# Patient Record
Sex: FEMALE | Race: WHITE | NOT HISPANIC OR LATINO
[De-identification: names, ages, dates, MRNs, and addresses within clinical notes are randomized per-mention and may not be internally consistent; named-entity substitution may affect disease eponyms.]

---

## 2017-01-12 ENCOUNTER — APPOINTMENT (OUTPATIENT)
Dept: DERMATOLOGY | Facility: CLINIC | Age: 47
End: 2017-01-12

## 2017-07-12 ENCOUNTER — APPOINTMENT (OUTPATIENT)
Dept: DERMATOLOGY | Facility: CLINIC | Age: 47
End: 2017-07-12

## 2017-12-13 ENCOUNTER — FORM ENCOUNTER (OUTPATIENT)
Age: 47
End: 2017-12-13

## 2018-06-03 ENCOUNTER — FORM ENCOUNTER (OUTPATIENT)
Age: 48
End: 2018-06-03

## 2018-06-04 ENCOUNTER — FORM ENCOUNTER (OUTPATIENT)
Age: 48
End: 2018-06-04

## 2018-07-05 ENCOUNTER — APPOINTMENT (OUTPATIENT)
Dept: DERMATOLOGY | Facility: CLINIC | Age: 48
End: 2018-07-05
Payer: COMMERCIAL

## 2018-07-05 PROCEDURE — 99213 OFFICE O/P EST LOW 20 MIN: CPT

## 2019-07-12 ENCOUNTER — APPOINTMENT (OUTPATIENT)
Dept: DERMATOLOGY | Facility: CLINIC | Age: 49
End: 2019-07-12

## 2019-12-29 ENCOUNTER — EMERGENCY (EMERGENCY)
Facility: HOSPITAL | Age: 49
LOS: 1 days | Discharge: HOME | End: 2019-12-29
Attending: EMERGENCY MEDICINE
Payer: COMMERCIAL

## 2019-12-29 VITALS
HEART RATE: 96 BPM | HEIGHT: 62 IN | TEMPERATURE: 99 F | RESPIRATION RATE: 20 BRPM | SYSTOLIC BLOOD PRESSURE: 188 MMHG | DIASTOLIC BLOOD PRESSURE: 81 MMHG | OXYGEN SATURATION: 99 % | WEIGHT: 214.95 LBS

## 2019-12-29 DIAGNOSIS — C44.91 BASAL CELL CARCINOMA OF SKIN, UNSPECIFIED: Chronic | ICD-10-CM

## 2019-12-29 DIAGNOSIS — Z98.89 OTHER SPECIFIED POSTPROCEDURAL STATES: Chronic | ICD-10-CM

## 2019-12-29 LAB
ALBUMIN SERPL ELPH-MCNC: 4 G/DL — SIGNIFICANT CHANGE UP (ref 3.3–5.2)
ALP SERPL-CCNC: 92 U/L — SIGNIFICANT CHANGE UP (ref 40–120)
ALT FLD-CCNC: 12 U/L — SIGNIFICANT CHANGE UP
ANION GAP SERPL CALC-SCNC: 15 MMOL/L — SIGNIFICANT CHANGE UP (ref 5–17)
APTT BLD: 34.5 SEC — SIGNIFICANT CHANGE UP (ref 27.5–36.3)
AST SERPL-CCNC: 16 U/L — SIGNIFICANT CHANGE UP
B PERT DNA SPEC QL NAA+PROBE: SIGNIFICANT CHANGE UP
BILIRUB SERPL-MCNC: 0.4 MG/DL — SIGNIFICANT CHANGE UP (ref 0.4–2)
BUN SERPL-MCNC: 10 MG/DL — SIGNIFICANT CHANGE UP (ref 8–20)
C PNEUM DNA SPEC QL NAA+PROBE: SIGNIFICANT CHANGE UP
CALCIUM SERPL-MCNC: 9.3 MG/DL — SIGNIFICANT CHANGE UP (ref 8.6–10.2)
CHLORIDE SERPL-SCNC: 97 MMOL/L — LOW (ref 98–107)
CO2 SERPL-SCNC: 25 MMOL/L — SIGNIFICANT CHANGE UP (ref 22–29)
CREAT SERPL-MCNC: 0.51 MG/DL — SIGNIFICANT CHANGE UP (ref 0.5–1.3)
FLUAV H1 2009 PAND RNA SPEC QL NAA+PROBE: SIGNIFICANT CHANGE UP
FLUAV H1 RNA SPEC QL NAA+PROBE: SIGNIFICANT CHANGE UP
FLUAV H3 RNA SPEC QL NAA+PROBE: SIGNIFICANT CHANGE UP
FLUAV SUBTYP SPEC NAA+PROBE: DETECTED — SIGNIFICANT CHANGE UP
FLUBV RNA SPEC QL NAA+PROBE: SIGNIFICANT CHANGE UP
GLUCOSE SERPL-MCNC: 100 MG/DL — SIGNIFICANT CHANGE UP (ref 70–115)
HADV DNA SPEC QL NAA+PROBE: SIGNIFICANT CHANGE UP
HCOV PNL SPEC NAA+PROBE: SIGNIFICANT CHANGE UP
HCT VFR BLD CALC: 44.8 % — SIGNIFICANT CHANGE UP (ref 34.5–45)
HGB BLD-MCNC: 14.9 G/DL — SIGNIFICANT CHANGE UP (ref 11.5–15.5)
HMPV RNA SPEC QL NAA+PROBE: SIGNIFICANT CHANGE UP
HPIV1 RNA SPEC QL NAA+PROBE: SIGNIFICANT CHANGE UP
HPIV2 RNA SPEC QL NAA+PROBE: SIGNIFICANT CHANGE UP
HPIV3 RNA SPEC QL NAA+PROBE: SIGNIFICANT CHANGE UP
HPIV4 RNA SPEC QL NAA+PROBE: SIGNIFICANT CHANGE UP
INR BLD: 1.2 RATIO — HIGH (ref 0.88–1.16)
MCHC RBC-ENTMCNC: 26.8 PG — LOW (ref 27–34)
MCHC RBC-ENTMCNC: 33.3 GM/DL — SIGNIFICANT CHANGE UP (ref 32–36)
MCV RBC AUTO: 80.7 FL — SIGNIFICANT CHANGE UP (ref 80–100)
NT-PROBNP SERPL-SCNC: 92 PG/ML — SIGNIFICANT CHANGE UP (ref 0–300)
PLATELET # BLD AUTO: 269 K/UL — SIGNIFICANT CHANGE UP (ref 150–400)
POTASSIUM SERPL-MCNC: 3.1 MMOL/L — LOW (ref 3.5–5.3)
POTASSIUM SERPL-SCNC: 3.1 MMOL/L — LOW (ref 3.5–5.3)
PROT SERPL-MCNC: 7.3 G/DL — SIGNIFICANT CHANGE UP (ref 6.6–8.7)
PROTHROM AB SERPL-ACNC: 13.9 SEC — HIGH (ref 10–12.9)
RAPID RVP RESULT: DETECTED
RBC # BLD: 5.55 M/UL — HIGH (ref 3.8–5.2)
RBC # FLD: 13 % — SIGNIFICANT CHANGE UP (ref 10.3–14.5)
RV+EV RNA SPEC QL NAA+PROBE: SIGNIFICANT CHANGE UP
SODIUM SERPL-SCNC: 137 MMOL/L — SIGNIFICANT CHANGE UP (ref 135–145)
TROPONIN T SERPL-MCNC: <0.01 NG/ML — SIGNIFICANT CHANGE UP (ref 0–0.06)
WBC # BLD: 10.28 K/UL — SIGNIFICANT CHANGE UP (ref 3.8–10.5)
WBC # FLD AUTO: 10.28 K/UL — SIGNIFICANT CHANGE UP (ref 3.8–10.5)

## 2019-12-29 PROCEDURE — 93010 ELECTROCARDIOGRAM REPORT: CPT

## 2019-12-29 PROCEDURE — 99284 EMERGENCY DEPT VISIT MOD MDM: CPT

## 2019-12-29 PROCEDURE — 71046 X-RAY EXAM CHEST 2 VIEWS: CPT | Mod: 26

## 2019-12-29 RX ORDER — ALBUTEROL 90 UG/1
2.5 AEROSOL, METERED ORAL ONCE
Refills: 0 | Status: COMPLETED | OUTPATIENT
Start: 2019-12-29 | End: 2019-12-29

## 2019-12-29 RX ORDER — SODIUM CHLORIDE 9 MG/ML
1000 INJECTION, SOLUTION INTRAVENOUS ONCE
Refills: 0 | Status: COMPLETED | OUTPATIENT
Start: 2019-12-29 | End: 2019-12-29

## 2019-12-29 RX ORDER — POTASSIUM CHLORIDE 20 MEQ
40 PACKET (EA) ORAL ONCE
Refills: 0 | Status: COMPLETED | OUTPATIENT
Start: 2019-12-29 | End: 2019-12-29

## 2019-12-29 RX ORDER — ACETAMINOPHEN 500 MG
650 TABLET ORAL ONCE
Refills: 0 | Status: COMPLETED | OUTPATIENT
Start: 2019-12-29 | End: 2019-12-29

## 2019-12-29 RX ORDER — POTASSIUM CHLORIDE 20 MEQ
10 PACKET (EA) ORAL ONCE
Refills: 0 | Status: COMPLETED | OUTPATIENT
Start: 2019-12-29 | End: 2019-12-29

## 2019-12-29 RX ORDER — SODIUM CHLORIDE 9 MG/ML
3 INJECTION INTRAMUSCULAR; INTRAVENOUS; SUBCUTANEOUS ONCE
Refills: 0 | Status: COMPLETED | OUTPATIENT
Start: 2019-12-29 | End: 2019-12-29

## 2019-12-29 RX ORDER — IBUPROFEN 200 MG
600 TABLET ORAL ONCE
Refills: 0 | Status: COMPLETED | OUTPATIENT
Start: 2019-12-29 | End: 2019-12-29

## 2019-12-29 RX ADMIN — SODIUM CHLORIDE 3 MILLILITER(S): 9 INJECTION INTRAMUSCULAR; INTRAVENOUS; SUBCUTANEOUS at 22:15

## 2019-12-29 RX ADMIN — Medication 600 MILLIGRAM(S): at 22:04

## 2019-12-29 RX ADMIN — Medication 40 MILLIEQUIVALENT(S): at 20:24

## 2019-12-29 RX ADMIN — ALBUTEROL 2.5 MILLIGRAM(S): 90 AEROSOL, METERED ORAL at 17:00

## 2019-12-29 RX ADMIN — Medication 100 MILLIEQUIVALENT(S): at 22:04

## 2019-12-29 RX ADMIN — Medication 650 MILLIGRAM(S): at 17:00

## 2019-12-29 RX ADMIN — SODIUM CHLORIDE 3000 MILLILITER(S): 9 INJECTION, SOLUTION INTRAVENOUS at 20:24

## 2019-12-29 NOTE — ED PROVIDER NOTE - NS ED ROS FT
Denies /n/v//sob/palpitations/ rash/dizziness/abd.pain/d/c/dysuria/hematuria  +chills myalgias ha sore throat cough chest tightness

## 2019-12-29 NOTE — ED PROVIDER NOTE - CLINICAL SUMMARY MEDICAL DECISION MAKING FREE TEXT BOX
influenza +--most likely all viral illness k 3.1 hemolyzed will replete check 2nd trop potassium; hydrate neb saline for cough--anticipate dc

## 2019-12-29 NOTE — ED PROVIDER NOTE - PATIENT PORTAL LINK FT
DISPLAY PLAN FREE TEXT You can access the FollowMyHealth Patient Portal offered by [facility] by registering at the following website: [email or web address]. By joining [employer]’s FollowMyHealth portal, you will also be able to view your health information using other applications (apps) compatible with our system.

## 2019-12-29 NOTE — ED PROVIDER NOTE - OBJECTIVE STATEMENT
50yo F no pmhx pw with chest tightness body aches ha chills since yesterday. notes that was moving 48yo F no pmhx pw with chest tightness body aches mild cough sore throat ha chills since yesterday. notes that was moving things around the house yesterday so though body was sore after that, went to urgent care was tested for the flu was told hat its negative +sick contacts  with uri symptoms; told urgent care that was having tightness in the chest was told to go to the ED for evaluation.  pt notes that always has high bp when sees doctors. notes cp non radiating, no numbness/tingling no diaphoresis.  Denies n/v/sob/palpitations/rash/dizziness/abd.pain/d/c/dysuria/hematuria. no recent travel no hx of dvt/pe

## 2019-12-30 VITALS
DIASTOLIC BLOOD PRESSURE: 85 MMHG | SYSTOLIC BLOOD PRESSURE: 173 MMHG | RESPIRATION RATE: 18 BRPM | OXYGEN SATURATION: 96 % | TEMPERATURE: 99 F | HEART RATE: 86 BPM

## 2019-12-30 LAB
ANION GAP SERPL CALC-SCNC: 12 MMOL/L — SIGNIFICANT CHANGE UP (ref 5–17)
BUN SERPL-MCNC: 9 MG/DL — SIGNIFICANT CHANGE UP (ref 8–20)
CALCIUM SERPL-MCNC: 8.6 MG/DL — SIGNIFICANT CHANGE UP (ref 8.6–10.2)
CHLORIDE SERPL-SCNC: 101 MMOL/L — SIGNIFICANT CHANGE UP (ref 98–107)
CO2 SERPL-SCNC: 24 MMOL/L — SIGNIFICANT CHANGE UP (ref 22–29)
CREAT SERPL-MCNC: 0.54 MG/DL — SIGNIFICANT CHANGE UP (ref 0.5–1.3)
GLUCOSE SERPL-MCNC: 118 MG/DL — HIGH (ref 70–115)
POTASSIUM SERPL-MCNC: 3.1 MMOL/L — LOW (ref 3.5–5.3)
POTASSIUM SERPL-SCNC: 3.1 MMOL/L — LOW (ref 3.5–5.3)
SODIUM SERPL-SCNC: 137 MMOL/L — SIGNIFICANT CHANGE UP (ref 135–145)

## 2019-12-30 PROCEDURE — 85027 COMPLETE CBC AUTOMATED: CPT

## 2019-12-30 PROCEDURE — 83880 ASSAY OF NATRIURETIC PEPTIDE: CPT

## 2019-12-30 PROCEDURE — 80048 BASIC METABOLIC PNL TOTAL CA: CPT

## 2019-12-30 PROCEDURE — 99284 EMERGENCY DEPT VISIT MOD MDM: CPT | Mod: 25

## 2019-12-30 PROCEDURE — 71046 X-RAY EXAM CHEST 2 VIEWS: CPT

## 2019-12-30 PROCEDURE — 85610 PROTHROMBIN TIME: CPT

## 2019-12-30 PROCEDURE — 93005 ELECTROCARDIOGRAM TRACING: CPT

## 2019-12-30 PROCEDURE — 87486 CHLMYD PNEUM DNA AMP PROBE: CPT

## 2019-12-30 PROCEDURE — 80053 COMPREHEN METABOLIC PANEL: CPT

## 2019-12-30 PROCEDURE — 94640 AIRWAY INHALATION TREATMENT: CPT

## 2019-12-30 PROCEDURE — 85730 THROMBOPLASTIN TIME PARTIAL: CPT

## 2019-12-30 PROCEDURE — 87798 DETECT AGENT NOS DNA AMP: CPT

## 2019-12-30 PROCEDURE — 87581 M.PNEUMON DNA AMP PROBE: CPT

## 2019-12-30 PROCEDURE — 87633 RESP VIRUS 12-25 TARGETS: CPT

## 2019-12-30 PROCEDURE — 36415 COLL VENOUS BLD VENIPUNCTURE: CPT

## 2019-12-30 PROCEDURE — 84484 ASSAY OF TROPONIN QUANT: CPT

## 2019-12-30 PROCEDURE — 96374 THER/PROPH/DIAG INJ IV PUSH: CPT

## 2019-12-30 PROCEDURE — 93010 ELECTROCARDIOGRAM REPORT: CPT

## 2019-12-30 RX ORDER — POTASSIUM CHLORIDE 20 MEQ
40 PACKET (EA) ORAL ONCE
Refills: 0 | Status: COMPLETED | OUTPATIENT
Start: 2019-12-30 | End: 2019-12-30

## 2019-12-30 RX ADMIN — Medication 40 MILLIEQUIVALENT(S): at 02:03

## 2020-01-20 ENCOUNTER — RESULT REVIEW (OUTPATIENT)
Age: 50
End: 2020-01-20

## 2020-01-20 ENCOUNTER — APPOINTMENT (OUTPATIENT)
Dept: DERMATOLOGY | Facility: CLINIC | Age: 50
End: 2020-01-20
Payer: COMMERCIAL

## 2020-01-20 PROCEDURE — 11102 TANGNTL BX SKIN SINGLE LES: CPT

## 2020-01-20 PROCEDURE — 99214 OFFICE O/P EST MOD 30 MIN: CPT | Mod: 25

## 2020-03-26 ENCOUNTER — APPOINTMENT (OUTPATIENT)
Dept: DERMATOLOGY | Facility: CLINIC | Age: 50
End: 2020-03-26
Payer: COMMERCIAL

## 2020-03-26 PROCEDURE — 13132 CMPLX RPR F/C/C/M/N/AX/G/H/F: CPT

## 2020-03-26 PROCEDURE — 17311 MOHS 1 STAGE H/N/HF/G: CPT

## 2020-05-07 ENCOUNTER — APPOINTMENT (OUTPATIENT)
Dept: DERMATOLOGY | Facility: CLINIC | Age: 50
End: 2020-05-07
Payer: COMMERCIAL

## 2020-05-07 DIAGNOSIS — C44.319 BASAL CELL CARCINOMA OF SKIN OF OTHER PARTS OF FACE: ICD-10-CM

## 2020-05-07 PROCEDURE — 99213 OFFICE O/P EST LOW 20 MIN: CPT

## 2020-12-09 ENCOUNTER — APPOINTMENT (OUTPATIENT)
Dept: UROLOGY | Facility: CLINIC | Age: 50
End: 2020-12-09
Payer: COMMERCIAL

## 2020-12-09 VITALS
DIASTOLIC BLOOD PRESSURE: 104 MMHG | BODY MASS INDEX: 39.56 KG/M2 | HEIGHT: 62 IN | SYSTOLIC BLOOD PRESSURE: 189 MMHG | OXYGEN SATURATION: 99 % | HEART RATE: 73 BPM | WEIGHT: 215 LBS

## 2020-12-09 PROCEDURE — 99204 OFFICE O/P NEW MOD 45 MIN: CPT

## 2020-12-09 PROCEDURE — 99072 ADDL SUPL MATRL&STAF TM PHE: CPT

## 2020-12-10 LAB
APPEARANCE: CLEAR
BACTERIA: NEGATIVE
BILIRUBIN URINE: NEGATIVE
BLOOD URINE: NEGATIVE
COLOR: NORMAL
GLUCOSE QUALITATIVE U: NEGATIVE
HYALINE CASTS: 1 /LPF
KETONES URINE: NEGATIVE
LEUKOCYTE ESTERASE URINE: NEGATIVE
MICROSCOPIC-UA: NORMAL
NITRITE URINE: NEGATIVE
PH URINE: 6.5
PROTEIN URINE: NEGATIVE
RED BLOOD CELLS URINE: 3 /HPF
SPECIFIC GRAVITY URINE: 1.02
SQUAMOUS EPITHELIAL CELLS: 2 /HPF
UROBILINOGEN URINE: NORMAL
WHITE BLOOD CELLS URINE: 2 /HPF

## 2020-12-11 LAB — BACTERIA UR CULT: NORMAL

## 2020-12-11 NOTE — HISTORY OF PRESENT ILLNESS
[FreeTextEntry1] : This patient has history of left kidney obstruction and had a left pyeloplasty in 2014. Her Renal scan at the time showed 16% kidney function and was considering a nephrectomy at that time but never got it. SHe now presents with left flank pain that is constant 2/10 which is dull. She denies any other urinary symptoms.\par  \par

## 2020-12-21 DIAGNOSIS — N13.5 CROSSING VESSEL AND STRICTURE OF URETER W/OUT HYDRONEPHROSIS: ICD-10-CM

## 2020-12-26 ENCOUNTER — APPOINTMENT (OUTPATIENT)
Dept: DERMATOLOGY | Facility: CLINIC | Age: 50
End: 2020-12-26

## 2021-01-25 LAB
ANION GAP SERPL CALC-SCNC: 14 MMOL/L
BUN SERPL-MCNC: 12 MG/DL
CALCIUM SERPL-MCNC: 9.5 MG/DL
CHLORIDE SERPL-SCNC: 102 MMOL/L
CO2 SERPL-SCNC: 25 MMOL/L
CREAT SERPL-MCNC: 0.7 MG/DL
GLUCOSE SERPL-MCNC: 88 MG/DL
POTASSIUM SERPL-SCNC: 3.9 MMOL/L
SODIUM SERPL-SCNC: 142 MMOL/L

## 2021-03-02 ENCOUNTER — OUTPATIENT (OUTPATIENT)
Dept: OUTPATIENT SERVICES | Facility: HOSPITAL | Age: 51
LOS: 1 days | End: 2021-03-02
Payer: COMMERCIAL

## 2021-03-02 DIAGNOSIS — C44.91 BASAL CELL CARCINOMA OF SKIN, UNSPECIFIED: Chronic | ICD-10-CM

## 2021-03-02 DIAGNOSIS — Q62.39 OTHER OBSTRUCTIVE DEFECTS OF RENAL PELVIS AND URETER: ICD-10-CM

## 2021-03-02 DIAGNOSIS — Z98.89 OTHER SPECIFIED POSTPROCEDURAL STATES: Chronic | ICD-10-CM

## 2021-03-02 PROCEDURE — 78708 K FLOW/FUNCT IMAGE W/DRUG: CPT | Mod: 26

## 2021-03-02 PROCEDURE — 78708 K FLOW/FUNCT IMAGE W/DRUG: CPT

## 2021-03-02 PROCEDURE — A9562: CPT

## 2021-03-02 RX ORDER — FUROSEMIDE 40 MG
40 TABLET ORAL ONCE
Refills: 0 | Status: DISCONTINUED | OUTPATIENT
Start: 2021-03-02 | End: 2021-03-16

## 2021-03-02 RX ORDER — SODIUM CHLORIDE 9 MG/ML
977 INJECTION INTRAMUSCULAR; INTRAVENOUS; SUBCUTANEOUS ONCE
Refills: 0 | Status: DISCONTINUED | OUTPATIENT
Start: 2021-03-02 | End: 2021-03-16

## 2021-03-07 ENCOUNTER — FORM ENCOUNTER (OUTPATIENT)
Age: 51
End: 2021-03-07

## 2021-03-08 ENCOUNTER — APPOINTMENT (OUTPATIENT)
Dept: DERMATOLOGY | Facility: CLINIC | Age: 51
End: 2021-03-08
Payer: COMMERCIAL

## 2021-03-08 ENCOUNTER — APPOINTMENT (OUTPATIENT)
Dept: CARDIOLOGY | Facility: CLINIC | Age: 51
End: 2021-03-08
Payer: COMMERCIAL

## 2021-03-08 ENCOUNTER — NON-APPOINTMENT (OUTPATIENT)
Age: 51
End: 2021-03-08

## 2021-03-08 VITALS
WEIGHT: 212 LBS | HEIGHT: 62 IN | OXYGEN SATURATION: 98 % | HEART RATE: 69 BPM | RESPIRATION RATE: 16 BRPM | TEMPERATURE: 98.7 F | DIASTOLIC BLOOD PRESSURE: 100 MMHG | SYSTOLIC BLOOD PRESSURE: 162 MMHG | BODY MASS INDEX: 39.01 KG/M2

## 2021-03-08 DIAGNOSIS — R07.89 OTHER CHEST PAIN: ICD-10-CM

## 2021-03-08 DIAGNOSIS — R94.39 ABNORMAL RESULT OF OTHER CARDIOVASCULAR FUNCTION STUDY: ICD-10-CM

## 2021-03-08 DIAGNOSIS — K21.9 GASTRO-ESOPHAGEAL REFLUX DISEASE W/OUT ESOPHAGITIS: ICD-10-CM

## 2021-03-08 PROCEDURE — 99072 ADDL SUPL MATRL&STAF TM PHE: CPT

## 2021-03-08 PROCEDURE — 99214 OFFICE O/P EST MOD 30 MIN: CPT

## 2021-03-08 PROCEDURE — 93000 ELECTROCARDIOGRAM COMPLETE: CPT

## 2021-03-08 RX ORDER — OMEPRAZOLE MAGNESIUM 20 MG/1
20 TABLET, DELAYED RELEASE ORAL DAILY
Refills: 0 | Status: ACTIVE | COMMUNITY

## 2021-03-08 RX ORDER — FLUTICASONE PROPIONATE 50 UG/1
50 SPRAY, METERED NASAL
Qty: 16 | Refills: 0 | Status: ACTIVE | COMMUNITY
Start: 2020-11-21

## 2021-03-08 NOTE — REASON FOR VISIT
[FreeTextEntry1] : 50 year old female presenting for cardiac evaluation last seen in 2016. Elevated blood pressure readings prompted this visit.\par \par While at her OBGYN  her BP was 132/90, during her annual physical 128/72\par \par Although she works for a [employer] company she does stand majority of the day.  Does not have any formal exercise regimen but when climbing stairs she does experience some SOB, attributes this to her mask\par \par Her last stress test from 2015 revealed 1.0mm ST depression during peak stress in multiple leads but attributes that to a very stressful period in her life. There has been no chest pain/tightness. At a certain point she was on Atenolol, unsure when this was stopped\par \par She is s/p renal eval  at [facility] with a hx of L kidney obstruction and pyeloplasty 2014 . At that time her left kid. fxn was 16 % states  now by her report 27%\par \par Diet seems to be suboptimal  although she does try to follow  serving size standards.

## 2021-03-08 NOTE — PHYSICAL EXAM
[General Appearance - Well Developed] : well developed [Normal Conjunctiva] : the conjunctiva exhibited no abnormalities [Eyelids - No Xanthelasma] : the eyelids demonstrated no xanthelasmas [Normal Oral Mucosa] : normal oral mucosa [No Oral Pallor] : no oral pallor [No Oral Cyanosis] : no oral cyanosis [Normal Oropharynx] : normal oropharynx [Normal Jugular Venous A Waves Present] : normal jugular venous A waves present [Normal Jugular Venous V Waves Present] : normal jugular venous V waves present [No Jugular Venous Cardoso A Waves] : no jugular venous cardoso A waves [Respiration, Rhythm And Depth] : normal respiratory rhythm and effort [Exaggerated Use Of Accessory Muscles For Inspiration] : no accessory muscle use [Auscultation Breath Sounds / Voice Sounds] : lungs were clear to auscultation bilaterally [Chest Palpation] : palpation of the chest revealed no abnormalities [Lungs Percussion] : the lungs were normal to percussion [Heart Rate And Rhythm] : heart rate and rhythm were normal [Heart Sounds] : normal S1 and S2 [Murmurs] : no murmurs present [Arterial Pulses Normal] : the arterial pulses were normal [Edema] : no peripheral edema present [Veins - Varicosity Changes] : no varicosital changes were noted in the lower extremities [Bowel Sounds] : normal bowel sounds [Abdomen Soft] : soft [Abdomen Tenderness] : non-tender [Abdomen Mass (___ Cm)] : no abdominal mass palpated [Abdomen Hernia] : no hernia was discovered [Abnormal Walk] : normal gait [Gait - Sufficient For Exercise Testing] : the gait was sufficient for exercise testing [Nail Clubbing] : no clubbing of the fingernails [Cyanosis, Localized] : no localized cyanosis [Petechial Hemorrhages (___cm)] : no petechial hemorrhages [Nail Splinter Hemorrhages] : no splinter hemorrhages of the nails [] : no ischemic changes [Fingers Osler's Nodes] : Osler's nodes were not seenon the fingers [Skin Color & Pigmentation] : normal skin color and pigmentation [FreeTextEntry1] : obese

## 2021-03-08 NOTE — HISTORY OF PRESENT ILLNESS
[FreeTextEntry1] : There seems to be a hx of elevated blood pressures possibly exacerbated by personal stressors.

## 2021-03-08 NOTE — ASSESSMENT
[FreeTextEntry1] : ECG-  SR at 69 bpm, possible AWMI pattern, ST and T wave abnormality, \par \par Lab data 1/20/20\par Chol. 161\par HDL    41\par LDL    95\par Tri.   126\par A1C 5.1\par Creat. 0.60\par \par \par Impression\par 50  year old female presenting after a 5 year lapse in care with concerns of turning 51 and periodic elevated blood pressure readings.\par \par -Minor nonspecific T wave abnormalities on ECG today which are not new.  Not having any cardiac related symptoms.\par \par -Mild HEWITT which she attributes to sedentary life style and maybe some weight gain.\par \par -No family hx of premature CAD\par \par -May be a component of white coat syndrome  with regard to her BP today. Does not monitor her BPs at home.\par \par -Lipids acceptable.\par \par -Last stress test likely false positive with no evidence on SPECT.\par \par -BMI 38 and aware of obesity status and its impact on her health. Left renal status being formally evaluated by urology\par \par \par Plan\par 1. Instructed to check her blood pressure daily and call back in 2 weeks with an average.\par \par 2. Repeat BW through her PCP.\par \par 3. Clinical asymptomatic from  coronary perspective. \par \par 4. No medications to be prescribed at this time. \par \par 5. Stress the need to watch diet carefully, avoid carbs , starches , sugars. Needs to incorporate more  leafy greens and lean proteins\par \par 6. Encouraged that she start some formal exercise regimen.\par \par Follow up can be made for one year unless her  blood pressures are elevated.

## 2021-03-31 ENCOUNTER — APPOINTMENT (OUTPATIENT)
Age: 51
End: 2021-03-31
Payer: COMMERCIAL

## 2021-03-31 VITALS
SYSTOLIC BLOOD PRESSURE: 185 MMHG | OXYGEN SATURATION: 98 % | WEIGHT: 216 LBS | TEMPERATURE: 97 F | BODY MASS INDEX: 39.75 KG/M2 | HEART RATE: 78 BPM | DIASTOLIC BLOOD PRESSURE: 108 MMHG | HEIGHT: 62 IN

## 2021-03-31 PROCEDURE — 99072 ADDL SUPL MATRL&STAF TM PHE: CPT

## 2021-03-31 PROCEDURE — 99214 OFFICE O/P EST MOD 30 MIN: CPT

## 2021-03-31 NOTE — ASSESSMENT
[FreeTextEntry1] : 50yoF with hx L pyeloplasty in 2014 w/recurrent L flank pain\par We had a long discussion regarding treatment plan.   She will need referral to Dr Hoenig oe [place] for treatment of her renal stones.   At the same time the UPJ cab be evaluated.    She has improved renal function but her left kidney is smaller and scarred.   She has minimal pain at the moment.\par \par 30 min discussion with review of the images and complex treatment planning and writing her note\par \par

## 2021-03-31 NOTE — HISTORY OF PRESENT ILLNESS
[Flank Pain] : flank pain [FreeTextEntry1] : 50yoFwith hx L pyeloplasty in 2014 by Dr. Hardy. Previously with 16% function.  \par Patient did well for 7 years and now has recurrence of her flank pain.\par Patient reports flank pain monthly, was previously associated with her period, she is s/p hysterectomy for fibroids and unilateral oophorectomy\par Renal scan 3/2021 demonstrates 28% function on the L.\par CT also demonstrates 1.3cm L upper pole stone and 6mm L lower pole stone\par

## 2021-03-31 NOTE — PHYSICAL EXAM
[General Appearance - Well Developed] : well developed [General Appearance - Well Nourished] : well nourished [Normal Appearance] : normal appearance [Well Groomed] : well groomed [General Appearance - In No Acute Distress] : no acute distress [Respiration, Rhythm And Depth] : normal respiratory rhythm and effort [Normal Station and Gait] : the gait and station were normal for the patient's age [Skin Color & Pigmentation] : normal skin color and pigmentation [] : no rash [No Focal Deficits] : no focal deficits [Oriented To Time, Place, And Person] : oriented to person, place, and time [Not Anxious] : not anxious [Edema] : no peripheral edema [Abdomen Soft] : soft [Abdomen Tenderness] : non-tender [Costovertebral Angle Tenderness] : no ~M costovertebral angle tenderness [FreeTextEntry1] : obese abd [Urinary Bladder Findings] : the bladder was normal on palpation

## 2021-04-19 ENCOUNTER — APPOINTMENT (OUTPATIENT)
Dept: DERMATOLOGY | Facility: CLINIC | Age: 51
End: 2021-04-19

## 2021-05-06 ENCOUNTER — APPOINTMENT (OUTPATIENT)
Dept: UROLOGY | Facility: CLINIC | Age: 51
End: 2021-05-06

## 2021-06-09 ENCOUNTER — APPOINTMENT (OUTPATIENT)
Dept: UROLOGY | Facility: CLINIC | Age: 51
End: 2021-06-09
Payer: COMMERCIAL

## 2021-06-09 VITALS — HEIGHT: 62 IN | BODY MASS INDEX: 38.64 KG/M2 | WEIGHT: 210 LBS

## 2021-06-09 PROCEDURE — 99072 ADDL SUPL MATRL&STAF TM PHE: CPT

## 2021-06-09 PROCEDURE — 99214 OFFICE O/P EST MOD 30 MIN: CPT

## 2021-06-09 NOTE — ASSESSMENT
[FreeTextEntry1] : CT scan reviewed: 1.5 cm left upper pole stone, 9-10 mm left lower pole stone. Density 1243 HU.\par \par I had long discussion with patient about their stones, and about options, risks, and benefits of all treatments.  Main options for definitive stone treatment include ESWL, URS, PCNL.  \par \par ESWL success best with smaller, less dense stones, and with short skin to stone distance and favorable location of the stone within the urinary tract, while URS is more successful treatment with multiple stones, more dense stones, or challenging body habitus or stone location.  PCNL is best option for larger, more dense and complex stones, and particularly those involving the lower pole.  Non-definitive stone treatment options for drainage, using either stents or nephrostomy, also reviewed: these are of lower immediate surgical risks, but incur multiple procedures to manage and may have their own complications and effects on quality of life.  Still, nephrostomy or nephroureteral catheter can allow maintenance of urinary system drainage without surgical risks, and management in office with exchanges (avoiding the anesthesia and testing which would be present with bilateral internal stent exchange).\par \par Risks of nontreatment with obstruction can lead to very high rate of renal function loss in stone-bearing kidney over the next months to years.\par \par In this patient's case, I recommend... left PCNL as best option, though tract might be supra-12th rib and require flexible nephroscopy or second access. Staged left URS with laser next-best option\par \par Risks/benefits/success/recovery expectations all reviewed at length, particularly with respect to patient's comorbidities, and inclusive of infection/sepsis, bleeding, need for secondary procedures or secondary stages such as embolization or open surgery, and even risks of death due to acute issues superimposed on comorbidities.\par \par Pt prefers to consider options, d/w family, and states she will call and arrange

## 2021-06-09 NOTE — HISTORY OF PRESENT ILLNESS
[FreeTextEntry1] : 50 yr old female presents to establish care for kidney stones. Initially seen by Dr. Hardy in 2014 for left flank pain , s/p pyeloplasty 5/2014. December 2020, pt developed left flank pain- saw Dr. Kingsley. CT in 01/21- left kidney 1.3 cm stone, and 0.6 cm stone. Left mild- mod hydro. Left kidney is smaller than right. Patient than saw Dr. Bennett. Renal scan 3/2021 demonstrates 28% function on the L\par \par No current flank or abdominal pain, no hematuria or fever. [Dysuria] : no dysuria [Hematuria - Gross] : no gross hematuria

## 2021-06-09 NOTE — PHYSICAL EXAM
[General Appearance - Well Developed] : well developed [Edema] : no peripheral edema [] : no respiratory distress [Abdomen Soft] : soft [Normal Station and Gait] : the gait and station were normal for the patient's age [Skin Color & Pigmentation] : normal skin color and pigmentation [No Focal Deficits] : no focal deficits [Oriented To Time, Place, And Person] : oriented to person, place, and time [Affect] : the affect was normal [Mood] : the mood was normal [Not Anxious] : not anxious

## 2021-08-02 ENCOUNTER — OUTPATIENT (OUTPATIENT)
Dept: OUTPATIENT SERVICES | Facility: HOSPITAL | Age: 51
LOS: 1 days | End: 2021-08-02
Payer: COMMERCIAL

## 2021-08-02 VITALS
HEIGHT: 62 IN | DIASTOLIC BLOOD PRESSURE: 70 MMHG | OXYGEN SATURATION: 98 % | SYSTOLIC BLOOD PRESSURE: 102 MMHG | WEIGHT: 212.97 LBS | RESPIRATION RATE: 15 BRPM | TEMPERATURE: 98 F | HEART RATE: 80 BPM

## 2021-08-02 DIAGNOSIS — C44.91 BASAL CELL CARCINOMA OF SKIN, UNSPECIFIED: Chronic | ICD-10-CM

## 2021-08-02 DIAGNOSIS — N20.0 CALCULUS OF KIDNEY: ICD-10-CM

## 2021-08-02 DIAGNOSIS — Z98.89 OTHER SPECIFIED POSTPROCEDURAL STATES: Chronic | ICD-10-CM

## 2021-08-02 DIAGNOSIS — Z01.818 ENCOUNTER FOR OTHER PREPROCEDURAL EXAMINATION: ICD-10-CM

## 2021-08-02 LAB
ANION GAP SERPL CALC-SCNC: 12 MMOL/L — SIGNIFICANT CHANGE UP (ref 5–17)
BLD GP AB SCN SERPL QL: NEGATIVE — SIGNIFICANT CHANGE UP
BUN SERPL-MCNC: 14 MG/DL — SIGNIFICANT CHANGE UP (ref 7–23)
CALCIUM SERPL-MCNC: 9.3 MG/DL — SIGNIFICANT CHANGE UP (ref 8.4–10.5)
CHLORIDE SERPL-SCNC: 102 MMOL/L — SIGNIFICANT CHANGE UP (ref 96–108)
CO2 SERPL-SCNC: 26 MMOL/L — SIGNIFICANT CHANGE UP (ref 22–31)
CREAT SERPL-MCNC: 0.5 MG/DL — SIGNIFICANT CHANGE UP (ref 0.5–1.3)
GLUCOSE SERPL-MCNC: 86 MG/DL — SIGNIFICANT CHANGE UP (ref 70–99)
HCT VFR BLD CALC: 44.6 % — SIGNIFICANT CHANGE UP (ref 34.5–45)
HGB BLD-MCNC: 14.7 G/DL — SIGNIFICANT CHANGE UP (ref 11.5–15.5)
MCHC RBC-ENTMCNC: 26.4 PG — LOW (ref 27–34)
MCHC RBC-ENTMCNC: 33 GM/DL — SIGNIFICANT CHANGE UP (ref 32–36)
MCV RBC AUTO: 80.2 FL — SIGNIFICANT CHANGE UP (ref 80–100)
NRBC # BLD: 0 /100 WBCS — SIGNIFICANT CHANGE UP (ref 0–0)
PLATELET # BLD AUTO: 285 K/UL — SIGNIFICANT CHANGE UP (ref 150–400)
POTASSIUM SERPL-MCNC: 3.3 MMOL/L — LOW (ref 3.5–5.3)
POTASSIUM SERPL-SCNC: 3.3 MMOL/L — LOW (ref 3.5–5.3)
RBC # BLD: 5.56 M/UL — HIGH (ref 3.8–5.2)
RBC # FLD: 13.3 % — SIGNIFICANT CHANGE UP (ref 10.3–14.5)
RH IG SCN BLD-IMP: POSITIVE — SIGNIFICANT CHANGE UP
SODIUM SERPL-SCNC: 140 MMOL/L — SIGNIFICANT CHANGE UP (ref 135–145)
WBC # BLD: 11.79 K/UL — HIGH (ref 3.8–10.5)
WBC # FLD AUTO: 11.79 K/UL — HIGH (ref 3.8–10.5)

## 2021-08-02 PROCEDURE — 87086 URINE CULTURE/COLONY COUNT: CPT

## 2021-08-02 PROCEDURE — 85027 COMPLETE CBC AUTOMATED: CPT

## 2021-08-02 PROCEDURE — 86850 RBC ANTIBODY SCREEN: CPT

## 2021-08-02 PROCEDURE — 80048 BASIC METABOLIC PNL TOTAL CA: CPT

## 2021-08-02 PROCEDURE — G0463: CPT

## 2021-08-02 PROCEDURE — 86900 BLOOD TYPING SEROLOGIC ABO: CPT

## 2021-08-02 PROCEDURE — 86901 BLOOD TYPING SEROLOGIC RH(D): CPT

## 2021-08-02 RX ORDER — CIPROFLOXACIN LACTATE 400MG/40ML
400 VIAL (ML) INTRAVENOUS ONCE
Refills: 0 | Status: DISCONTINUED | OUTPATIENT
Start: 2021-08-16 | End: 2021-08-30

## 2021-08-02 NOTE — H&P PST ADULT - NSICDXPROBLEM_GEN_ALL_CORE_FT
PROBLEM DIAGNOSES  Problem: Calculus of kidney  Assessment and Plan: Left percutaneous nephrostolithotomy on 8/16/2021  Preop instructions given.  Labs-cbc, bmp, urine culture, T&S all pending  Medical evaluation pending.  Chlorhexidine to affected area preop  Urine for pregnancy preop  ABO to be drawn preop, upon admission  COVID vaccine 8/13/2021

## 2021-08-02 NOTE — H&P PST ADULT - ACTIVITY
Works FT, walks on weekend, stairs in home, walked from parking garage to PST, housework, yardwork, swim in pool during summer

## 2021-08-02 NOTE — H&P PST ADULT - NSANTHOSAYNRD_GEN_A_CORE
No. PAPITO screening performed.  STOP BANG Legend: 0-2 = LOW Risk; 3-4 = INTERMEDIATE Risk; 5-8 = HIGH Risk

## 2021-08-02 NOTE — H&P PST ADULT - NSICDXPASTMEDICALHX_GEN_ALL_CORE_FT
PAST MEDICAL HISTORY:  Basal cell cancer nose    Congenital obstruction of ureteropelvic junction     Obesity, morbid

## 2021-08-02 NOTE — H&P PST ADULT - HISTORY OF PRESENT ILLNESS
Mrs. Taylor is a 50 year old woman with PMH obesity and kidney stone s/p pyeloplasty in 2014 who presents with 2 kidney stone in her left kidney 1.3 cm and 0.6 cm with mild to moderate hydronephrosis scheduled for left percutaneous nephrostolithotomy on 8/16/2021.    Denies s/s of COVID, denies recent international travel    COVID PCR scheduled for  8/13/2021

## 2021-08-03 LAB
CULTURE RESULTS: SIGNIFICANT CHANGE UP
SPECIMEN SOURCE: SIGNIFICANT CHANGE UP

## 2021-08-12 DIAGNOSIS — Z01.818 ENCOUNTER FOR OTHER PREPROCEDURAL EXAMINATION: ICD-10-CM

## 2021-08-13 ENCOUNTER — APPOINTMENT (OUTPATIENT)
Dept: DISASTER EMERGENCY | Facility: CLINIC | Age: 51
End: 2021-08-13

## 2021-08-14 LAB — SARS-COV-2 N GENE NPH QL NAA+PROBE: NOT DETECTED

## 2021-08-15 ENCOUNTER — TRANSCRIPTION ENCOUNTER (OUTPATIENT)
Age: 51
End: 2021-08-15

## 2021-08-16 ENCOUNTER — OUTPATIENT (OUTPATIENT)
Dept: OUTPATIENT SERVICES | Facility: HOSPITAL | Age: 51
LOS: 1 days | End: 2021-08-16
Payer: COMMERCIAL

## 2021-08-16 ENCOUNTER — APPOINTMENT (OUTPATIENT)
Dept: UROLOGY | Facility: HOSPITAL | Age: 51
End: 2021-08-16

## 2021-08-16 ENCOUNTER — RESULT REVIEW (OUTPATIENT)
Age: 51
End: 2021-08-16

## 2021-08-16 VITALS
OXYGEN SATURATION: 99 % | SYSTOLIC BLOOD PRESSURE: 161 MMHG | DIASTOLIC BLOOD PRESSURE: 86 MMHG | TEMPERATURE: 97 F | RESPIRATION RATE: 18 BRPM | HEART RATE: 78 BPM

## 2021-08-16 VITALS
RESPIRATION RATE: 18 BRPM | SYSTOLIC BLOOD PRESSURE: 162 MMHG | HEART RATE: 73 BPM | WEIGHT: 212.97 LBS | TEMPERATURE: 98 F | OXYGEN SATURATION: 98 % | HEIGHT: 62 IN | DIASTOLIC BLOOD PRESSURE: 94 MMHG

## 2021-08-16 DIAGNOSIS — C44.91 BASAL CELL CARCINOMA OF SKIN, UNSPECIFIED: Chronic | ICD-10-CM

## 2021-08-16 DIAGNOSIS — N20.0 CALCULUS OF KIDNEY: ICD-10-CM

## 2021-08-16 DIAGNOSIS — Z98.89 OTHER SPECIFIED POSTPROCEDURAL STATES: Chronic | ICD-10-CM

## 2021-08-16 LAB
ANION GAP SERPL CALC-SCNC: 14 MMOL/L — SIGNIFICANT CHANGE UP (ref 5–17)
BASOPHILS # BLD AUTO: 0.05 K/UL — SIGNIFICANT CHANGE UP (ref 0–0.2)
BASOPHILS NFR BLD AUTO: 0.6 % — SIGNIFICANT CHANGE UP (ref 0–2)
BUN SERPL-MCNC: 13 MG/DL — SIGNIFICANT CHANGE UP (ref 7–23)
CALCIUM SERPL-MCNC: 8.8 MG/DL — SIGNIFICANT CHANGE UP (ref 8.4–10.5)
CHLORIDE SERPL-SCNC: 102 MMOL/L — SIGNIFICANT CHANGE UP (ref 96–108)
CO2 SERPL-SCNC: 24 MMOL/L — SIGNIFICANT CHANGE UP (ref 22–31)
CREAT SERPL-MCNC: 0.64 MG/DL — SIGNIFICANT CHANGE UP (ref 0.5–1.3)
EOSINOPHIL # BLD AUTO: 0.15 K/UL — SIGNIFICANT CHANGE UP (ref 0–0.5)
EOSINOPHIL NFR BLD AUTO: 1.7 % — SIGNIFICANT CHANGE UP (ref 0–6)
GLUCOSE SERPL-MCNC: 118 MG/DL — HIGH (ref 70–99)
HCG UR QL: NEGATIVE — SIGNIFICANT CHANGE UP
HCT VFR BLD CALC: 45.7 % — HIGH (ref 34.5–45)
HGB BLD-MCNC: 14.8 G/DL — SIGNIFICANT CHANGE UP (ref 11.5–15.5)
IMM GRANULOCYTES NFR BLD AUTO: 0.6 % — SIGNIFICANT CHANGE UP (ref 0–1.5)
LYMPHOCYTES # BLD AUTO: 1.83 K/UL — SIGNIFICANT CHANGE UP (ref 1–3.3)
LYMPHOCYTES # BLD AUTO: 20.5 % — SIGNIFICANT CHANGE UP (ref 13–44)
MCHC RBC-ENTMCNC: 26.4 PG — LOW (ref 27–34)
MCHC RBC-ENTMCNC: 32.4 GM/DL — SIGNIFICANT CHANGE UP (ref 32–36)
MCV RBC AUTO: 81.6 FL — SIGNIFICANT CHANGE UP (ref 80–100)
MONOCYTES # BLD AUTO: 0.26 K/UL — SIGNIFICANT CHANGE UP (ref 0–0.9)
MONOCYTES NFR BLD AUTO: 2.9 % — SIGNIFICANT CHANGE UP (ref 2–14)
NEUTROPHILS # BLD AUTO: 6.58 K/UL — SIGNIFICANT CHANGE UP (ref 1.8–7.4)
NEUTROPHILS NFR BLD AUTO: 73.7 % — SIGNIFICANT CHANGE UP (ref 43–77)
NRBC # BLD: 0 /100 WBCS — SIGNIFICANT CHANGE UP (ref 0–0)
PLATELET # BLD AUTO: 267 K/UL — SIGNIFICANT CHANGE UP (ref 150–400)
POTASSIUM SERPL-MCNC: 3.1 MMOL/L — LOW (ref 3.5–5.3)
POTASSIUM SERPL-SCNC: 3.1 MMOL/L — LOW (ref 3.5–5.3)
RBC # BLD: 5.6 M/UL — HIGH (ref 3.8–5.2)
RBC # FLD: 13.3 % — SIGNIFICANT CHANGE UP (ref 10.3–14.5)
RH IG SCN BLD-IMP: POSITIVE — SIGNIFICANT CHANGE UP
SODIUM SERPL-SCNC: 140 MMOL/L — SIGNIFICANT CHANGE UP (ref 135–145)
WBC # BLD: 8.92 K/UL — SIGNIFICANT CHANGE UP (ref 3.8–10.5)
WBC # FLD AUTO: 8.92 K/UL — SIGNIFICANT CHANGE UP (ref 3.8–10.5)

## 2021-08-16 PROCEDURE — C1889: CPT

## 2021-08-16 PROCEDURE — 85025 COMPLETE CBC W/AUTO DIFF WBC: CPT

## 2021-08-16 PROCEDURE — 52332 CYSTOSCOPY AND TREATMENT: CPT | Mod: LT,59

## 2021-08-16 PROCEDURE — 71045 X-RAY EXAM CHEST 1 VIEW: CPT

## 2021-08-16 PROCEDURE — 50390 DRAINAGE OF KIDNEY LESION: CPT | Mod: LT,59

## 2021-08-16 PROCEDURE — 50081 PERQ NL/PL LITHOTRP CPLX>2CM: CPT

## 2021-08-16 PROCEDURE — C1769: CPT

## 2021-08-16 PROCEDURE — 50081 PERQ NL/PL LITHOTRP CPLX>2CM: CPT | Mod: LT

## 2021-08-16 PROCEDURE — C1758: CPT

## 2021-08-16 PROCEDURE — 71045 X-RAY EXAM CHEST 1 VIEW: CPT | Mod: 26

## 2021-08-16 PROCEDURE — 82365 CALCULUS SPECTROSCOPY: CPT

## 2021-08-16 PROCEDURE — C9399: CPT

## 2021-08-16 PROCEDURE — C2625: CPT

## 2021-08-16 PROCEDURE — 88300 SURGICAL PATH GROSS: CPT | Mod: 26,59

## 2021-08-16 PROCEDURE — 87086 URINE CULTURE/COLONY COUNT: CPT

## 2021-08-16 PROCEDURE — 87070 CULTURE OTHR SPECIMN AEROBIC: CPT

## 2021-08-16 PROCEDURE — 80048 BASIC METABOLIC PNL TOTAL CA: CPT

## 2021-08-16 PROCEDURE — 50430 NJX PX NFROSGRM &/URTRGRM: CPT | Mod: LT,59

## 2021-08-16 PROCEDURE — C1887: CPT

## 2021-08-16 PROCEDURE — 88300 SURGICAL PATH GROSS: CPT

## 2021-08-16 PROCEDURE — 81025 URINE PREGNANCY TEST: CPT

## 2021-08-16 PROCEDURE — C1726: CPT

## 2021-08-16 PROCEDURE — 76000 FLUOROSCOPY <1 HR PHYS/QHP: CPT

## 2021-08-16 RX ORDER — ASCORBIC ACID 60 MG
1 TABLET,CHEWABLE ORAL
Qty: 0 | Refills: 0 | DISCHARGE

## 2021-08-16 RX ORDER — HYDRALAZINE HCL 50 MG
5 TABLET ORAL ONCE
Refills: 0 | Status: COMPLETED | OUTPATIENT
Start: 2021-08-16 | End: 2021-08-16

## 2021-08-16 RX ORDER — TAMSULOSIN HYDROCHLORIDE 0.4 MG/1
1 CAPSULE ORAL
Qty: 30 | Refills: 0
Start: 2021-08-16 | End: 2021-09-14

## 2021-08-16 RX ORDER — FENTANYL CITRATE 50 UG/ML
25 INJECTION INTRAVENOUS
Refills: 0 | Status: DISCONTINUED | OUTPATIENT
Start: 2021-08-16 | End: 2021-08-16

## 2021-08-16 RX ORDER — LABETALOL HCL 100 MG
5 TABLET ORAL ONCE
Refills: 0 | Status: COMPLETED | OUTPATIENT
Start: 2021-08-16 | End: 2021-08-16

## 2021-08-16 RX ORDER — LABETALOL HCL 100 MG
10 TABLET ORAL ONCE
Refills: 0 | Status: COMPLETED | OUTPATIENT
Start: 2021-08-16 | End: 2021-08-16

## 2021-08-16 RX ORDER — AZILSARTAN KAMEDOXOMIL 40 MG/1
1 TABLET ORAL
Qty: 0 | Refills: 0 | DISCHARGE

## 2021-08-16 RX ORDER — FENTANYL CITRATE 50 UG/ML
50 INJECTION INTRAVENOUS
Refills: 0 | Status: DISCONTINUED | OUTPATIENT
Start: 2021-08-16 | End: 2021-08-16

## 2021-08-16 RX ORDER — SODIUM CHLORIDE 9 MG/ML
3 INJECTION INTRAMUSCULAR; INTRAVENOUS; SUBCUTANEOUS EVERY 8 HOURS
Refills: 0 | Status: DISCONTINUED | OUTPATIENT
Start: 2021-08-16 | End: 2021-08-16

## 2021-08-16 RX ORDER — SODIUM CHLORIDE 9 MG/ML
1000 INJECTION, SOLUTION INTRAVENOUS
Refills: 0 | Status: DISCONTINUED | OUTPATIENT
Start: 2021-08-16 | End: 2021-08-30

## 2021-08-16 RX ORDER — POTASSIUM CHLORIDE 20 MEQ
40 PACKET (EA) ORAL ONCE
Refills: 0 | Status: COMPLETED | OUTPATIENT
Start: 2021-08-16 | End: 2021-08-16

## 2021-08-16 RX ORDER — CHLORHEXIDINE GLUCONATE 213 G/1000ML
1 SOLUTION TOPICAL ONCE
Refills: 0 | Status: DISCONTINUED | OUTPATIENT
Start: 2021-08-16 | End: 2021-08-16

## 2021-08-16 RX ORDER — BENZOCAINE AND MENTHOL 5; 1 G/100ML; G/100ML
1 LIQUID ORAL ONCE
Refills: 0 | Status: COMPLETED | OUTPATIENT
Start: 2021-08-16 | End: 2021-08-16

## 2021-08-16 RX ORDER — OXYCODONE HYDROCHLORIDE 5 MG/1
1 TABLET ORAL
Qty: 8 | Refills: 0
Start: 2021-08-16 | End: 2021-08-17

## 2021-08-16 RX ORDER — CIPROFLOXACIN LACTATE 400MG/40ML
1 VIAL (ML) INTRAVENOUS
Qty: 2 | Refills: 0
Start: 2021-08-16 | End: 2021-08-16

## 2021-08-16 RX ORDER — ONDANSETRON 8 MG/1
4 TABLET, FILM COATED ORAL ONCE
Refills: 0 | Status: DISCONTINUED | OUTPATIENT
Start: 2021-08-16 | End: 2021-08-16

## 2021-08-16 RX ORDER — OMEPRAZOLE 10 MG/1
1 CAPSULE, DELAYED RELEASE ORAL
Qty: 0 | Refills: 0 | DISCHARGE

## 2021-08-16 RX ORDER — CHOLECALCIFEROL (VITAMIN D3) 125 MCG
1 CAPSULE ORAL
Qty: 0 | Refills: 0 | DISCHARGE

## 2021-08-16 RX ORDER — LIDOCAINE HCL 20 MG/ML
0.2 VIAL (ML) INJECTION ONCE
Refills: 0 | Status: DISCONTINUED | OUTPATIENT
Start: 2021-08-16 | End: 2021-08-16

## 2021-08-16 RX ADMIN — BENZOCAINE AND MENTHOL 1 LOZENGE: 5; 1 LIQUID ORAL at 12:36

## 2021-08-16 RX ADMIN — Medication 5 MILLIGRAM(S): at 10:48

## 2021-08-16 RX ADMIN — Medication 5 MILLIGRAM(S): at 13:20

## 2021-08-16 RX ADMIN — Medication 40 MILLIEQUIVALENT(S): at 12:18

## 2021-08-16 RX ADMIN — Medication 10 MILLIGRAM(S): at 11:22

## 2021-08-16 NOTE — ASU DISCHARGE PLAN (ADULT/PEDIATRIC) - ASU DC SPECIAL INSTRUCTIONSFT
Resume home medications as instructed by prescriptions; a prescription for 1 day of ciprofloxacin antibiotics was sent to your pharmacy ([facility]), in addition to a prescription for 30 days of Flomax. Please take as prescribed. You may take over-the-counter ibuprofen and/or acetaminophen as needed for pain. A prescription for oxycodone was sent to the pharmacy, please take as prescribed as needed for severe pain.     There is an incision on the left side of your flank where the stones were removed. The incision will heal on its own and will leak fluid for a few days but should get better. You may use a dressing daily as needed.     You may have intermittent pink tinged urine and slight flank pain when you urinate.  This is normal and due to the stent in your ureter.   If your urine becomes bright red or with clots, please call the office.    Please follow up with Dr. Hoenig in 1 week after discharge from the hospital for stent removal. You may call [phone] to schedule an appointment.     Follow-up with primary care doctor as well.     Call [phone] and return to the ED for chest pain, shortness of breath, significant increase in pain, or significant change in color of surgical sites.

## 2021-08-16 NOTE — PRE-ANESTHESIA EVALUATION ADULT - NSANTHPMHFT_GEN_ALL_CORE
Mrs. Taylor is a 50 year old woman with PMH obesity and kidney stone s/p pyeloplasty in 2014 who presents with 2 kidney stone in her left kidney 1.3 cm and 0.6 cm with mild to moderate hydronephrosis scheduled for left percutaneous nephrostolithotomy on 8/16/2021.

## 2021-08-16 NOTE — PROGRESS NOTE ADULT - SUBJECTIVE AND OBJECTIVE BOX
Post op Check:    Pt seen and examined. Pt with sore throat. Mild discomfort of L flank. Pain is controlled. Denies SOB/CP/N/V.     Vital Signs Last 24 Hrs  T(C): 36.2 (16 Aug 2021 09:15), Max: 36.8 (16 Aug 2021 06:04)  T(F): 97.2 (16 Aug 2021 09:15), Max: 98.2 (16 Aug 2021 06:04)  HR: 67 (16 Aug 2021 11:45) (63 - 80)  BP: 162/86 (16 Aug 2021 11:45) (155/87 - 192/87)  BP(mean): 118 (16 Aug 2021 11:45) (115 - 125)  RR: 16 (16 Aug 2021 11:45) (16 - 18)  SpO2: 95% (16 Aug 2021 11:45) (93% - 98%)    I&O's Summary    16 Aug 2021 07:01  -  16 Aug 2021 12:18  --------------------------------------------------------  IN: 0 mL / OUT: 1200 mL / NET: -1200 mL        Physical Exam  Gen: NAD, A&Ox3  Pulm: No respiratory distress, no subcostal retractions  Abd: Soft, NT, ND  Back: L flank dressing C/D/I. No flank tenderness. no palpable hematoma or ecchymosis  : no suprapubic tenderness. elizondo with clear pink urine. Elizondo balloon deflated and removed with incidence.                           14.8   8.92  )-----------( 267      ( 16 Aug 2021 09:57 )             45.7       08-16    140  |  102  |  13  ----------------------------<  118<H>  3.1<L>   |  24  |  0.64    Ca    8.8      16 Aug 2021 09:57    < from: Xray Chest 1 View AP/PA (08.16.21 @ 09:29) >  EXAM:  XR CHEST AP OR PA 1V                            PROCEDURE DATE:  08/16/2021            INTERPRETATION:  CLINICAL INFORMATION: Status post left PCNL, evaluate for pneumothorax    EXAM: Frontal view of the chest.    COMPARISON: Chest radiograph from 12/29/2019    FINDINGS:    The heart is normal in size.  The lungs are clear.  No pleural effusion or pneumothorax.    IMPRESSION:  No pneumothorax.    --- End of Report ---              VITA ASCENCIO MD; Resident Radiology  This document has beenelectronically signed.  KYUNG DIANA MD; Attending Radiologist  This document has been electronically signed. Aug 16 2021 11:26AM    < end of copied text >        Post op Check:    Pt seen and examined. Pt with sore throat. Mild discomfort of L flank. Pain is controlled. Denies SOB/CP/N/V.     Vital Signs Last 24 Hrs  T(C): 36.2 (16 Aug 2021 09:15), Max: 36.8 (16 Aug 2021 06:04)  T(F): 97.2 (16 Aug 2021 09:15), Max: 98.2 (16 Aug 2021 06:04)  HR: 67 (16 Aug 2021 11:45) (63 - 80)  BP: 162/86 (16 Aug 2021 11:45) (155/87 - 192/87)  BP(mean): 118 (16 Aug 2021 11:45) (115 - 125)  RR: 16 (16 Aug 2021 11:45) (16 - 18)  SpO2: 95% (16 Aug 2021 11:45) (93% - 98%)    I&O's Summary    16 Aug 2021 07:01  -  16 Aug 2021 12:18  --------------------------------------------------------  IN: 0 mL / OUT: 1200 mL / NET: -1200 mL        Physical Exam  Gen: NAD, A&Ox3  Pulm: No respiratory distress, no subcostal retractions  Abd: Soft, NT, ND  Back: L flank dressing C/D/I. No flank tenderness. no palpable hematoma or ecchymosis  : no suprapubic tenderness. elizondo with clear pink urine. Elizondo balloon deflated and removed without incidence.                           14.8   8.92  )-----------( 267      ( 16 Aug 2021 09:57 )             45.7       08-16    140  |  102  |  13  ----------------------------<  118<H>  3.1<L>   |  24  |  0.64    Ca    8.8      16 Aug 2021 09:57    < from: Xray Chest 1 View AP/PA (08.16.21 @ 09:29) >  EXAM:  XR CHEST AP OR PA 1V                            PROCEDURE DATE:  08/16/2021            INTERPRETATION:  CLINICAL INFORMATION: Status post left PCNL, evaluate for pneumothorax    EXAM: Frontal view of the chest.    COMPARISON: Chest radiograph from 12/29/2019    FINDINGS:    The heart is normal in size.  The lungs are clear.  No pleural effusion or pneumothorax.    IMPRESSION:  No pneumothorax.    --- End of Report ---              VITA ASCENCIO MD; Resident Radiology  This document has beenelectronically signed.  KYUNG DIANA MD; Attending Radiologist  This document has been electronically signed. Aug 16 2021 11:26AM    < end of copied text >

## 2021-08-16 NOTE — PROGRESS NOTE ADULT - ASSESSMENT
A/P: 50y Female s/p L PCNL (tubeless with stent w/o string)  DVT prophylaxis/OOB  Incentive spirometry  Strict I&O's  Analgesia and antiemetics as needed  Diet  TOV/PVR  cepacol lozenge for sore throat  Dispo: home after TOV

## 2021-08-16 NOTE — ASU PATIENT PROFILE, ADULT - TEACHING/LEARNING DEVELOPMENTAL CONSIDERATIONS
none [Father] : father [Cow's milk (Ounces per day ___)] : consumes [unfilled] oz of Cow's milk per day [Normal] : Normal [Vitamin] : Primary Fluoride Source: Vitamin [No] : No cigarette smoke exposure [Water heater temperature set at <120 degrees F] : Water heater temperature set at <120 degrees F [Car seat in back seat] : Car seat in back seat [Smoke Detectors] : Smoke detectors [Carbon Monoxide Detectors] : Carbon monoxide detectors [Delayed] : delayed [Gun in Home] : No gun in home [At risk for exposure to TB] : Not at risk for exposure to Tuberculosis [de-identified] : wagner [FreeTextEntry1] : patient is here for 2 year well care he has been doing well

## 2021-08-16 NOTE — ASU DISCHARGE PLAN (ADULT/PEDIATRIC) - CARE PROVIDER_API CALL
Hoenig, David M (MD)  Urology  [facility]- Dept. of Urology, [address] [place] [address]  Phone: [phone]  Fax: [phone]  Follow Up Time: 1 week

## 2021-08-18 LAB
CULTURE RESULTS: NO GROWTH — SIGNIFICANT CHANGE UP
CULTURE RESULTS: NO GROWTH — SIGNIFICANT CHANGE UP
CULTURE RESULTS: SIGNIFICANT CHANGE UP
SPECIMEN SOURCE: SIGNIFICANT CHANGE UP
SURGICAL PATHOLOGY STUDY: SIGNIFICANT CHANGE UP

## 2021-08-23 LAB — NIDUS STONE QN: SIGNIFICANT CHANGE UP

## 2021-08-24 ENCOUNTER — TRANSCRIPTION ENCOUNTER (OUTPATIENT)
Age: 51
End: 2021-08-24

## 2021-08-26 ENCOUNTER — APPOINTMENT (OUTPATIENT)
Dept: UROLOGY | Facility: CLINIC | Age: 51
End: 2021-08-26
Payer: COMMERCIAL

## 2021-08-26 VITALS
SYSTOLIC BLOOD PRESSURE: 168 MMHG | DIASTOLIC BLOOD PRESSURE: 95 MMHG | RESPIRATION RATE: 16 BRPM | OXYGEN SATURATION: 98 % | HEART RATE: 67 BPM

## 2021-08-26 DIAGNOSIS — E83.50 UNSPECIFIED DISORDER OF CALCIUM METABOLISM: ICD-10-CM

## 2021-08-26 DIAGNOSIS — Z87.442 PERSONAL HISTORY OF URINARY CALCULI: ICD-10-CM

## 2021-08-26 PROCEDURE — 99213 OFFICE O/P EST LOW 20 MIN: CPT | Mod: 25,24

## 2021-08-26 PROCEDURE — 52310 CYSTOSCOPY AND TREATMENT: CPT | Mod: 58

## 2021-08-31 NOTE — HISTORY OF PRESENT ILLNESS
[Currently Experiencing ___] :  [unfilled] [FreeTextEntry1] : Pt returns for metabolic evaluation and prevention of future stone disease following recent surgery for stone.  At issue today is review of the stone analysis, risk factors for future stone episodes and establishing whether they have pure dietary, metabolic, or mixed causes for their stone risks which is unrelated to the surgical management of their stone disease.\par \par They underwent PCNL on 8/16/21-- tubeless (stent only)\par \par Stone analysis: 90% Calcium phosphate (apatite)\par 10% Calcium oxalate dihydrate\par \par \par

## 2021-08-31 NOTE — ASSESSMENT
[FreeTextEntry1] : stent removed at cysto\par \par Diet modification reviewed at length- increasing fluids (primarily water), citrus is good, and decreasing/moderating salt, animal flesh protein, oxalate containing foods, and moderation of calcium intake (1000 mg/day is USRDA).\par \par I reviewed with the patient the risks of metabolic stone disease given their underlying risk parameters (all of which include large stones, multiple stones, bilateral stones, family history, and young age), and the indications for 24 hour urine metabolic assessment.\par \par We also discussed benefits of regular exercise and weight loss as independent risk reducers for stones.\par \par 1. Diet modification as discussed\par 2. 24 hour urine collection x 2 in the next few weeks\par 3. RTC with renal US in 8 to 10 weeks\par

## 2022-01-12 ENCOUNTER — APPOINTMENT (OUTPATIENT)
Dept: UROLOGY | Facility: CLINIC | Age: 52
End: 2022-01-12
Payer: COMMERCIAL

## 2022-01-12 ENCOUNTER — OUTPATIENT (OUTPATIENT)
Dept: OUTPATIENT SERVICES | Facility: HOSPITAL | Age: 52
LOS: 1 days | End: 2022-01-12
Payer: COMMERCIAL

## 2022-01-12 DIAGNOSIS — Q62.39 OTHER OBSTRUCTIVE DEFECTS OF RENAL PELVIS AND URETER: ICD-10-CM

## 2022-01-12 DIAGNOSIS — N20.0 CALCULUS OF KIDNEY: ICD-10-CM

## 2022-01-12 DIAGNOSIS — R35.0 FREQUENCY OF MICTURITION: ICD-10-CM

## 2022-01-12 DIAGNOSIS — C44.91 BASAL CELL CARCINOMA OF SKIN, UNSPECIFIED: Chronic | ICD-10-CM

## 2022-01-12 DIAGNOSIS — Z98.89 OTHER SPECIFIED POSTPROCEDURAL STATES: Chronic | ICD-10-CM

## 2022-01-12 PROCEDURE — 99212 OFFICE O/P EST SF 10 MIN: CPT

## 2022-01-12 PROCEDURE — 76775 US EXAM ABDO BACK WALL LIM: CPT

## 2022-01-12 PROCEDURE — 76775 US EXAM ABDO BACK WALL LIM: CPT | Mod: 26

## 2022-01-12 NOTE — ASSESSMENT
[FreeTextEntry1] : Renal US reviewed- No intrarenal stones visualized bilaterally there is mild fullness of the right renal pelvis and right ureter without remedios hydronephrosis. Left kidney with likely caliectasis. There is 2.7 x 1.9 cm cortical cyst with thin septations in the left upper pole with no vascularity and mid pole parapelvic cyst vs dilated calyx 2.1 x 1.5 cm in the left kidney. Both kidneys are normal in size and echogenicity without obvious stones or solid masses visualized.\par \par Diet modification reviewed at length- increasing fluids (primarily water), citrus is good, and decreasing/moderating salt, animal flesh protein, oxalate containing foods, and moderation of calcium intake (1000 mg/day is USRDA).\par \par I reviewed with the patient the risks of metabolic stone disease given their underlying risk parameters (all of which include large stones, multiple stones, bilateral stones, family history, and young age), and the indications for 24 hour urine metabolic assessment.\par \par We also discussed benefits of regular exercise and weight loss as independent risk reducers for stones. \par \par plan \par 1) 24 hr urine x 2- can review by phone or tele\par 2) RTC in 6 months with Renal US \par \par

## 2022-01-12 NOTE — HISTORY OF PRESENT ILLNESS
[None] : None [FreeTextEntry1] : 51 yr old female presents to follow up with kidney stones. Pt denies dysuria, hematuria or abd/ flank pain. Pt has not done 24 hr urine at this time. \par \par They underwent PCNL on 8/16/21-- tubeless (stent only)\par \par Stone analysis: 90% Calcium phosphate (apatite)\par 10% Calcium oxalate dihydrate\par \par H/o left pyeloplasty in past ([facility])

## 2022-01-25 DIAGNOSIS — Z78.9 OTHER SPECIFIED HEALTH STATUS: ICD-10-CM

## 2022-03-15 ENCOUNTER — APPOINTMENT (OUTPATIENT)
Dept: OBGYN | Facility: CLINIC | Age: 52
End: 2022-03-15
Payer: COMMERCIAL

## 2022-03-15 ENCOUNTER — APPOINTMENT (OUTPATIENT)
Dept: DERMATOLOGY | Facility: CLINIC | Age: 52
End: 2022-03-15
Payer: COMMERCIAL

## 2022-03-15 PROCEDURE — 99212 OFFICE O/P EST SF 10 MIN: CPT | Mod: 25

## 2022-03-15 PROCEDURE — 17000 DESTRUCT PREMALG LESION: CPT

## 2022-04-26 ENCOUNTER — APPOINTMENT (OUTPATIENT)
Dept: OBGYN | Facility: CLINIC | Age: 52
End: 2022-04-26
Payer: COMMERCIAL

## 2022-04-26 VITALS
BODY MASS INDEX: 39.93 KG/M2 | SYSTOLIC BLOOD PRESSURE: 150 MMHG | WEIGHT: 217 LBS | HEIGHT: 62 IN | DIASTOLIC BLOOD PRESSURE: 100 MMHG

## 2022-04-26 DIAGNOSIS — Z00.00 ENCOUNTER FOR GENERAL ADULT MEDICAL EXAMINATION W/OUT ABNORMAL FINDINGS: ICD-10-CM

## 2022-04-26 PROCEDURE — 99396 PREV VISIT EST AGE 40-64: CPT

## 2022-04-26 NOTE — HISTORY OF PRESENT ILLNESS
[Patient reported mammogram was normal] : Patient reported mammogram was normal [Patient reported PAP Smear was normal] : Patient reported PAP Smear was normal [Patient reported colonoscopy was normal] : Patient reported colonoscopy was normal [HIV test declined] : HIV test declined [Syphilis test declined] : Syphilis test declined [Gonorrhea test declined] : Gonorrhea test declined [Chlamydia test declined] : Chlamydia test declined [Trichomonas test declined] : Trichomonas test declined [HPV test declined] : HPV test declined [Hepatitis B test declined] : Hepatitis B test declined [Hepatitis C test declined] : Hepatitis C test declined [postmenopausal] : postmenopausal [Y] : Positive pregnancy history [Post-Menopause, No Sxs] : post-menopausal, currently without symptoms [Currently Active] : currently active [Men] : men [Vaginal] : vaginal [No] : No [Patient reported bone density results were abnormal] : Patient reported bone density results were abnormal [TextBox_4] : PT HERE FOR ANNUAL EXAM  [Mammogramdate] : 4/24/21 [PapSmeardate] : 3/8/21 [BoneDensityDate] : 4/16/2022 [TextBox_37] : Osteopenia T equals -1.3 [ColonoscopyDate] : 2020 [LMPDate] : 2017 [PGxTotal] : 1 [[facility]] : [facility] [[facility]] : [facility] [TextBox_6] : 2017 [TextBox_9] : 15 [FreeTextEntry1] : 2017

## 2022-04-26 NOTE — PHYSICAL EXAM
[Chaperone Present] : A chaperone was present in the examining room during all aspects of the physical examination [FreeTextEntry1] : PH [Appropriately responsive] : appropriately responsive [Alert] : alert [No Acute Distress] : no acute distress [No Lymphadenopathy] : no lymphadenopathy [Regular Rate Rhythm] : regular rate rhythm [No Murmurs] : no murmurs [Clear to Auscultation B/L] : clear to auscultation bilaterally [Soft] : soft [Non-tender] : non-tender [Non-distended] : non-distended [No HSM] : No HSM [No Lesions] : no lesions [No Mass] : no mass [Oriented x3] : oriented x3 [Examination Of The Breasts] : a normal appearance [No Masses] : no breast masses were palpable [Labia Majora] : normal [Labia Minora] : normal [Normal] : normal [Absent] : absent [Uterine Adnexae] : normal [Declined] : Patient declined rectal exam

## 2022-04-26 NOTE — PLAN
[FreeTextEntry1] : 51-year-old status post Angelica and LSO presents with osteopenia mild -1.3 T score we will recommend vitamin D3 2000 IUs a day with calcium replacement and multivitamin zinc and magnesium with weightbearing exercises breast self-exam taught patient will have mammogram bilateral breast sonogram Pap smear completed will return in 12 months.

## 2022-04-27 LAB
C TRACH RRNA SPEC QL NAA+PROBE: NOT DETECTED
HPV HIGH+LOW RISK DNA PNL CVX: NOT DETECTED
N GONORRHOEA RRNA SPEC QL NAA+PROBE: NOT DETECTED
SOURCE TP AMPLIFICATION: NORMAL

## 2022-05-03 LAB — CYTOLOGY CVX/VAG DOC THIN PREP: ABNORMAL

## 2022-07-27 ENCOUNTER — APPOINTMENT (OUTPATIENT)
Dept: UROLOGY | Facility: CLINIC | Age: 52
End: 2022-07-27

## 2023-03-23 ENCOUNTER — NON-APPOINTMENT (OUTPATIENT)
Age: 53
End: 2023-03-23

## 2023-03-23 ENCOUNTER — APPOINTMENT (OUTPATIENT)
Dept: CARDIOLOGY | Facility: CLINIC | Age: 53
End: 2023-03-23
Payer: COMMERCIAL

## 2023-03-23 VITALS
OXYGEN SATURATION: 99 % | DIASTOLIC BLOOD PRESSURE: 110 MMHG | HEIGHT: 62 IN | RESPIRATION RATE: 16 BRPM | WEIGHT: 216 LBS | HEART RATE: 104 BPM | BODY MASS INDEX: 39.75 KG/M2 | SYSTOLIC BLOOD PRESSURE: 170 MMHG

## 2023-03-23 VITALS — DIASTOLIC BLOOD PRESSURE: 100 MMHG | SYSTOLIC BLOOD PRESSURE: 180 MMHG

## 2023-03-23 PROCEDURE — 93000 ELECTROCARDIOGRAM COMPLETE: CPT

## 2023-03-23 PROCEDURE — 99214 OFFICE O/P EST MOD 30 MIN: CPT | Mod: 25

## 2023-03-23 NOTE — PHYSICAL EXAM
[Well Developed] : well developed [No Acute Distress] : no acute distress [Obese] : obese [Normal S1, S2] : normal S1, S2 [No Murmur] : no murmur [Normal] : alert and oriented, normal memory

## 2023-03-24 ENCOUNTER — APPOINTMENT (OUTPATIENT)
Dept: CARDIOLOGY | Facility: CLINIC | Age: 53
End: 2023-03-24
Payer: COMMERCIAL

## 2023-03-24 ENCOUNTER — NON-APPOINTMENT (OUTPATIENT)
Age: 53
End: 2023-03-24

## 2023-03-24 VITALS
SYSTOLIC BLOOD PRESSURE: 158 MMHG | OXYGEN SATURATION: 97 % | DIASTOLIC BLOOD PRESSURE: 98 MMHG | BODY MASS INDEX: 39.75 KG/M2 | WEIGHT: 216 LBS | RESPIRATION RATE: 15 BRPM | HEART RATE: 74 BPM | HEIGHT: 62 IN

## 2023-03-24 DIAGNOSIS — R03.0 ELEVATED BLOOD-PRESSURE READING, W/OUT DIAGNOSIS OF HYPERTENSION: ICD-10-CM

## 2023-03-24 PROCEDURE — 99214 OFFICE O/P EST MOD 30 MIN: CPT | Mod: 25

## 2023-03-24 PROCEDURE — 93000 ELECTROCARDIOGRAM COMPLETE: CPT

## 2023-03-29 NOTE — REASON FOR VISIT
[FreeTextEntry1] : MEL COSTA is a 52 year-old F presents here for urgent cardiac follow-up with concerns regarding elevated blood pressures.

## 2023-03-29 NOTE — ASSESSMENT
[FreeTextEntry1] : EKG: Sinus tachycardia at 104 bpm. ST depression and T wave abnormality, consider ischemia VS strain pattern\par \par Impression: Patient seen and case discussed with Dr. Worrell. \par 52-year-old female with hx of situational elevated blood pressures here for urgent evaluation regarding high blood pressure. \par \par 1. Elevated blood pressures possibly related to taking Sudafed and ear pain. \par \par 2. EKG shows ST depression concerning for ischemia versus strain pattern due to hypertension. She has no anginal symptoms. \par \par Plan:\par 1. Start Metoprolol for blood pressure control and cardioprotective purposes. Advised to call us immediately or seek care in the nearest ER if she develops any chest pain, headache, or shortness of breath. \par \par 2. Advised to avoid Sudafed in the future. \par \par Clinical followup tomorrow. \par \par

## 2023-03-29 NOTE — HISTORY OF PRESENT ILLNESS
[FreeTextEntry1] : Patient presented to urgent care regarding a "sinus infection". Complained of sinus congestion, pressure and ear pain. While at urgent care, she was found to be hypertensive and advised to be seen in our office. Was taking Sudafed for the past 2 days. Her blood pressure in our office was elevated (180/100) and she had EKG changes concerning for ischemia versus strain pattern. \par \par Today, she denies any chest pain, palpitations, dizziness, syncope, near-syncope, edema, SOB, orthopnea or PND. Still has sinus pressure and mild ear ache. \par \par Tolerating Metoprolol. \par \par

## 2023-03-29 NOTE — ASSESSMENT
[FreeTextEntry1] : EKG: Sinus rhythm at 74 bpm, PRWP, consider anterior infarct of indeterminate age. \par \par Impression:\par 52-year-old female with hx of situational elevated blood pressures here for cardiac follow-up. \par \par 1. Blood pressure elevated but improved since starting Metoprolol. \par     Hx of elevated blood pressures during office visits consistent with white coat hypertension\par     New onset HTN is also very possible.\par \par 2. ST depressions seen on EKG yesterday, likely consistent with strain, have resolved.. EKG today shows sinus rhythm and PRWP that was seen in her prior EKG's. \par \par 3. No anginal symptoms. Nuclear stress test in 2016 showed normal SPECT imaging. \par \par Plan:\par 1. Continue Metoprolol. Pt advised to check his blood pressure daily for 2 weeks and bring BP log to her next office visit. Adjustments to medications will be made at that time if appropriate. Advised to call us sooner if SBP's are consistently above 140. \par \par 2. Advised to avoid pseudoephedrine and phenylephrine in the future. \par \par 3. Echocardiogram to evaluate for hypertensive heart disease. \par \par 4. Continue to follow a heart healthy diet consisting of more vegetables, leans meats, whole grains, nuts and fruits. Avoid trans fats, saturated fats and processed meats.\par \par \par Clinical followup in April as scheduled. \par

## 2023-03-29 NOTE — REVIEW OF SYSTEMS
[Weight Loss (___ Lbs)] : [unfilled] ~Ulb weight loss [Earache] : earache [Sinus Pressure] : sinus pressure [Negative] : Heme/Lymph [Hearing Loss] : no hearing loss

## 2023-03-29 NOTE — HISTORY OF PRESENT ILLNESS
[FreeTextEntry1] : Patient presented to urgent care regarding a "sinus infection". Complained of sinus congestion, pressure and ear pain. While at urgent care, she was found to be hypertensive and advised to be seen in our office. \par \par Her blood pressure today is very elevated. She denies any headache, vision changes, edema, chest pain or discomfort, palpitations, shortness of breath or lightheadedness. \par \par States that she has been taking Sudafed for the past 2 days. \par Reports "normal" blood pressures when she goes to her PMD.

## 2023-03-29 NOTE — REASON FOR VISIT
[FreeTextEntry1] : MEL COSTA is a 52 year-old F presents here for cardiac follow-up. Was seen urgently yesterday for elevated blood pressure in the setting of Sudafed use for a sinus infection.

## 2023-03-29 NOTE — REVIEW OF SYSTEMS
[Earache] : earache [Sinus Pressure] : sinus pressure [Negative] : Heme/Lymph [Hearing Loss] : no hearing loss

## 2023-04-11 ENCOUNTER — APPOINTMENT (OUTPATIENT)
Dept: CARDIOLOGY | Facility: CLINIC | Age: 53
End: 2023-04-11
Payer: COMMERCIAL

## 2023-04-11 VITALS — SYSTOLIC BLOOD PRESSURE: 170 MMHG | DIASTOLIC BLOOD PRESSURE: 95 MMHG

## 2023-04-11 PROCEDURE — 93306 TTE W/DOPPLER COMPLETE: CPT

## 2023-04-20 ENCOUNTER — APPOINTMENT (OUTPATIENT)
Dept: CARDIOLOGY | Facility: CLINIC | Age: 53
End: 2023-04-20
Payer: COMMERCIAL

## 2023-04-20 VITALS
DIASTOLIC BLOOD PRESSURE: 100 MMHG | BODY MASS INDEX: 41.41 KG/M2 | HEART RATE: 60 BPM | WEIGHT: 225 LBS | SYSTOLIC BLOOD PRESSURE: 162 MMHG | HEIGHT: 62 IN | RESPIRATION RATE: 15 BRPM | OXYGEN SATURATION: 99 %

## 2023-04-20 PROCEDURE — 93000 ELECTROCARDIOGRAM COMPLETE: CPT

## 2023-04-20 PROCEDURE — 99214 OFFICE O/P EST MOD 30 MIN: CPT | Mod: 25

## 2023-04-20 RX ORDER — VALSARTAN 80 MG/1
80 TABLET, COATED ORAL DAILY
Qty: 90 | Refills: 1 | Status: DISCONTINUED | COMMUNITY
Start: 1900-01-01 | End: 2023-04-20

## 2023-04-20 RX ORDER — LEVOCETIRIZINE DIHYDROCHLORIDE 5 MG/1
5 TABLET ORAL DAILY
Refills: 0 | Status: DISCONTINUED | COMMUNITY
Start: 2023-03-23 | End: 2023-04-20

## 2023-04-20 RX ORDER — METOPROLOL SUCCINATE 50 MG/1
50 TABLET, EXTENDED RELEASE ORAL DAILY
Qty: 30 | Refills: 0 | Status: DISCONTINUED | COMMUNITY
Start: 2023-03-23 | End: 2023-04-20

## 2023-04-20 NOTE — HISTORY OF PRESENT ILLNESS
[FreeTextEntry1] : Patient presented to urgent care regarding a "sinus infection" and was found to be hypertensive while taking Sudafed. \par Her blood pressure in our office was elevated (180/100) and she had EKG changes concerning for ischemia versus strain pattern. \par \par Has noted some chest heaviness of several weeks duration, possibly dating to when she started metoprolol.\par Blood pressures were elevated on metoprolol and on 4/11/2023 valsartan 80 mg added.\par Home blood pressure record since then shows that she is running 172-190/100-115.\par Watching sodium intake\par Denies any increased stress\par No significant change in diet or weight.\par \par She does not exercise video 5 days a week without difficulty.\par Denies palpitations, dizziness, syncope, near-syncope, edema, SOB, orthopnea or PND. \par \par \par \par

## 2023-04-20 NOTE — REASON FOR VISIT
[FreeTextEntry1] : MEL COSTA is a 52 year-old F presents here for cardiac follow-up. \par \par Previously, she was seen urgently for elevated blood pressure in the setting of Sudafed use for a sinus infection. \par We discontinued Sudafed, began metoprolol XL 50 mg, and blood pressures remained elevated.\par 4/11/2023, valsartan 80 mg was added.\par

## 2023-04-20 NOTE — ASSESSMENT
[FreeTextEntry1] : EKG: Sinus rhythm at 63 bpm, PRWP, consider anterior infarct of indeterminate age. \par \par Echocardiogram   4/11/23\par Normal LV size and function ejection fraction 60%\par Mitral thickened with mild MR\par Mild pulmonary hypertension 40 mmHg\par \par Impression:\par 52-year-old female with hx of situational elevated blood pressures here for cardiac follow-up. \par \par 1.  Hypertension: Inadequately controlled despite metoprolol and valsartan.\par      No evidence of hypertensive heart disease and echocardiography.\par \par 2. ST depressions seen on EKG yesterday, likely consistent with strain, have resolved.. EKG today shows sinus rhythm and PRWP that was seen in her prior EKG's. \par \par 3.  Chest heaviness since beginning metoprolol, possibly related or due to recent onset of pollen\par      Unlikely to be angina.  Nuclear stress test in 2016 showed normal SPECT imaging. \par \par 4.  Evidence of mild pulmonary hypertension.\par \par Plan:\par 1.  We will discontinue metoprolol and valsartan.\par      Begin nifedipine 60 mg p.o. daily \par      Valsartan /12.5 daily\par \par 2. Advised to avoid pseudoephedrine and phenylephrine in the future. \par \par 3.  Patient to continue recording blood pressures over the next 2 weeks\par      BMP in 2 weeks\par \par 4. Continue to follow a heart healthy diet consisting of more vegetables, leans meats, whole grains, nuts and fruits. Avoid trans fats, saturated fats and processed meats.\par \par 5.  Office visit in 6 weeks\par

## 2023-04-20 NOTE — REVIEW OF SYSTEMS
[Earache] : earache [Sinus Pressure] : sinus pressure [Negative] : Heme/Lymph [Weight Gain (___ Lbs)] : [unfilled] ~Ulb weight gain [Hearing Loss] : no hearing loss

## 2023-04-25 ENCOUNTER — NON-APPOINTMENT (OUTPATIENT)
Age: 53
End: 2023-04-25

## 2023-04-25 ENCOUNTER — APPOINTMENT (OUTPATIENT)
Dept: OBGYN | Facility: CLINIC | Age: 53
End: 2023-04-25
Payer: COMMERCIAL

## 2023-04-25 VITALS
BODY MASS INDEX: 41.41 KG/M2 | WEIGHT: 225 LBS | SYSTOLIC BLOOD PRESSURE: 140 MMHG | DIASTOLIC BLOOD PRESSURE: 88 MMHG | HEIGHT: 62 IN

## 2023-04-25 DIAGNOSIS — Z12.39 ENCOUNTER FOR OTHER SCREENING FOR MALIGNANT NEOPLASM OF BREAST: ICD-10-CM

## 2023-04-25 DIAGNOSIS — Z77.22 CONTACT WITH AND (SUSPECTED) EXPOSURE TO ENVIRONMENTAL TOBACCO SMOKE (ACUTE) (CHRONIC): ICD-10-CM

## 2023-04-25 DIAGNOSIS — Z85.828 PERSONAL HISTORY OF OTHER MALIGNANT NEOPLASM OF SKIN: ICD-10-CM

## 2023-04-25 DIAGNOSIS — Z11.3 ENCOUNTER FOR SCREENING FOR INFECTIONS WITH A PREDOMINANTLY SEXUAL MODE OF TRANSMISSION: ICD-10-CM

## 2023-04-25 DIAGNOSIS — Z87.442 PERSONAL HISTORY OF URINARY CALCULI: ICD-10-CM

## 2023-04-25 DIAGNOSIS — Z85.820 PERSONAL HISTORY OF MALIGNANT MELANOMA OF SKIN: ICD-10-CM

## 2023-04-25 PROCEDURE — 99396 PREV VISIT EST AGE 40-64: CPT

## 2023-04-25 NOTE — PHYSICAL EXAM
[Chaperone Present] : A chaperone was present in the examining room during all aspects of the physical examination [Appropriately responsive] : appropriately responsive [Alert] : alert [No Acute Distress] : no acute distress [No Lymphadenopathy] : no lymphadenopathy [Regular Rate Rhythm] : regular rate rhythm [No Murmurs] : no murmurs [Clear to Auscultation B/L] : clear to auscultation bilaterally [Soft] : soft [Non-tender] : non-tender [Non-distended] : non-distended [No HSM] : No HSM [No Lesions] : no lesions [No Mass] : no mass [Oriented x3] : oriented x3 [Examination Of The Breasts] : a normal appearance [No Masses] : no breast masses were palpable [Labia Majora] : normal [Labia Minora] : normal [Atrophy] : atrophy [No Bleeding] : There was no active vaginal bleeding [Normal] : normal [Absent] : absent [Uterine Adnexae] : normal [Declined] : Patient declined rectal exam [FreeTextEntry1] : SM [FreeTextEntry6] : BMI 41.1 [FreeTextEntry8] : No masses nontender bilateral Limited exam secondary to body habitus

## 2023-04-25 NOTE — PLAN
[FreeTextEntry1] : Pap smear completed, mammogram , today continue vitamin D3 multivitamin zinc magnesium, Eros bone density next year blood pressure under better control we will follow-up with family medicine in 4 weeks.  Replens for vaginal dryness patient to return in 12 months

## 2023-04-25 NOTE — HISTORY OF PRESENT ILLNESS
[Patient reported colonoscopy was normal] : Patient reported colonoscopy was normal [HIV test declined] : HIV test declined [Syphilis test declined] : Syphilis test declined [Gonorrhea test declined] : Gonorrhea test declined [Chlamydia test declined] : Chlamydia test declined [Trichomonas test declined] : Trichomonas test declined [HPV test declined] : HPV test declined [Hepatitis B test declined] : Hepatitis B test declined [Hepatitis C test declined] : Hepatitis C test declined [postmenopausal] : postmenopausal [N] : Patient does not use contraception [Y] : Positive pregnancy history [N/A] : pregnancy not applicable [Currently Active] : currently active [Men] : men [Vaginal] : vaginal [No] : No [TextBox_4] : PT HERE FOR ANNUAL EXAM\par LMP:2017 [Mammogramdate] : 6/4/22 [TextBox_19] : BR2 [PapSmeardate] : 4/26/22 [TextBox_31] : WNL [BoneDensityDate] : 4/16/22 [TextBox_37] : OSTEOPENIA [ColonoscopyDate] : 2020 [LMPDate] : 2017 [PGxTotal] : 1 [[facility]] : 1 [[facility]] : 1 [TextBox_6] : 2017 [FreeTextEntry1] : 2017

## 2023-05-01 ENCOUNTER — APPOINTMENT (OUTPATIENT)
Dept: DERMATOLOGY | Facility: CLINIC | Age: 53
End: 2023-05-01
Payer: COMMERCIAL

## 2023-05-01 LAB — CYTOLOGY CVX/VAG DOC THIN PREP: NORMAL

## 2023-05-01 PROCEDURE — 99214 OFFICE O/P EST MOD 30 MIN: CPT

## 2023-05-03 RX ORDER — NIFEDIPINE 60 MG/1
60 TABLET, FILM COATED, EXTENDED RELEASE ORAL
Qty: 90 | Refills: 3 | Status: DISCONTINUED | COMMUNITY
Start: 2023-04-20 | End: 2023-05-03

## 2023-05-05 LAB
ANION GAP SERPL CALC-SCNC: 11 MMOL/L
BUN SERPL-MCNC: 18 MG/DL
CALCIUM SERPL-MCNC: 9.8 MG/DL
CHLORIDE SERPL-SCNC: 102 MMOL/L
CO2 SERPL-SCNC: 27 MMOL/L
CREAT SERPL-MCNC: 0.79 MG/DL
EGFR: 91 ML/MIN/1.73M2
GLUCOSE SERPL-MCNC: 109 MG/DL
POTASSIUM SERPL-SCNC: 4.3 MMOL/L
SODIUM SERPL-SCNC: 141 MMOL/L

## 2023-05-09 ENCOUNTER — APPOINTMENT (OUTPATIENT)
Dept: CARDIOLOGY | Facility: CLINIC | Age: 53
End: 2023-05-09

## 2023-05-31 LAB
ANION GAP SERPL CALC-SCNC: 12 MMOL/L
BUN SERPL-MCNC: 16 MG/DL
CALCIUM SERPL-MCNC: 9.6 MG/DL
CHLORIDE SERPL-SCNC: 100 MMOL/L
CO2 SERPL-SCNC: 27 MMOL/L
CREAT SERPL-MCNC: 0.81 MG/DL
EGFR: 88 ML/MIN/1.73M2
GLUCOSE SERPL-MCNC: 95 MG/DL
POTASSIUM SERPL-SCNC: 4.1 MMOL/L
SODIUM SERPL-SCNC: 139 MMOL/L

## 2023-06-12 ENCOUNTER — APPOINTMENT (OUTPATIENT)
Dept: CARDIOLOGY | Facility: CLINIC | Age: 53
End: 2023-06-12
Payer: COMMERCIAL

## 2023-06-12 VITALS
OXYGEN SATURATION: 99 % | WEIGHT: 215 LBS | SYSTOLIC BLOOD PRESSURE: 134 MMHG | RESPIRATION RATE: 16 BRPM | DIASTOLIC BLOOD PRESSURE: 80 MMHG | HEART RATE: 65 BPM | HEIGHT: 62 IN | BODY MASS INDEX: 39.56 KG/M2

## 2023-06-12 PROCEDURE — 99214 OFFICE O/P EST MOD 30 MIN: CPT

## 2023-06-15 NOTE — REVIEW OF SYSTEMS
[Weight Loss (___ Lbs)] : [unfilled] ~Ulb weight loss [Negative] : Heme/Lymph [Hearing Loss] : no hearing loss

## 2023-06-15 NOTE — ASSESSMENT
[FreeTextEntry1] : EKG: Sinus rhythm at 63 bpm, PRWP, consider anterior infarct of indeterminate age. \par \par Echocardiogram   4/11/23\par Normal LV size and function ejection fraction 60%\par Mitral thickened with mild MR\par Mild pulmonary hypertension 40 mmHg\par \par Impression:\par 52-year-old female with hx of situational elevated blood pressures here for cardiac follow-up. \par \par 1.  Hypertension: Mildly elevated on Valsartan-HCTZ 160/12.5 mg in the morning and Valsartan 160 mg in the evening. Nifedipine caused LE edema  and metoprolol caused chest heaviness ; both were discontinued. \par \par 2. Has a hx of ST depressions seen on EKG likely consistent with strain that resolved with a repeat in office EKG. She continues to exercise regularly and has no anginal symptoms. Chest heaviness while taking metoprolol has not recurred. Nuclear stress test in 2016 showed normal SPECT imaging. \par \par 3. Her most recent echocardiogram from April 2023 showed normal LV function and mild PHTN. No hx of PAPITO or pulmonary disease. \par \par 4. Reports elevated triglycerides at her annual physical. No records available for review. \par \par Plan:\par 1. Continue current antihypertensives. Add Bystolic 2.5 at bedtime. Monitor home blood pressures for 1 week then call us with home average. If BP well controlled on this regimen, will send her 90-day supply. Call us sooner if she has persistently elevated BP's or side effects from medications. Advised to avoid pseudoephedrine and phenylephrine in the future. \par \par 2. Continue to follow a heart healthy diet consisting of more vegetables, leans meats, whole grains, nuts and fruits. Avoid trans fats, saturated fats and processed meats. Avoid salt. \par \par 3. Pt advised to exercise for at least 30 minutes most days of the week. Any cardiac symptoms such as chest pain, palpitations or new shortness of breath should be reported.\par \par 4. Will request rcent labs from her PMD to followup lipids. Repeat lipids to be forwarded to us. \par \par 5. Consider repeat echocardiogram next year to reassess cardiac function and pulmonary artery pressures. \par \par Clinical follow-up in 6 weeks or sooner if needed.

## 2023-06-15 NOTE — REASON FOR VISIT
[FreeTextEntry1] : MEL COSTA is a 52 year-old F presents here for cardiac follow-up with concerns regarding uncontrolled hypertension. \par \par She was seen in April and her blood pressure was elevated in the setting of Sudafed use for a sinus infection. Toprol caused chest heaviness and discontinued. Her Valsartan was increased from 80 mg daily to 160 mg daily. \par \par Previously, she was seen urgently for elevated blood pressure in the setting of Sudafed use for a sinus infection. On her last visit, Toprol was discontinue as this caused chest heaviness. \par \par Her Valsartan was increased from 80 mg daily to 160 mg daily. HCTZ 12.5 and Nifedipine were added. \par \par On 5/3/23, patient called our office with complaints of bilateral lower extremity edema that started a few days after starting Nifedipine. Nifedipine was discontinued and she was advised to add Valsartan 160 mg in the evening. \par

## 2023-06-15 NOTE — HISTORY OF PRESENT ILLNESS
[FreeTextEntry1] : Patient reports resolution of LE edema since discontinuing Nifedipine. Was checking home blood pressures and reports readings around 130/78 to 140/80. \par \par She has recently changed her diet to a low carbohydrate program. She has decreased her salt intake significantly. Has been exercising 4-5 days a week. Reports gradual weight loss. Denies any chest pain, palpitations, shortness of breath, edema, orthopnea, or PND. \par \par Denies snoring or daytime somnolence. \par \par \par

## 2023-07-03 ENCOUNTER — APPOINTMENT (OUTPATIENT)
Dept: DERMATOLOGY | Facility: CLINIC | Age: 53
End: 2023-07-03

## 2023-07-21 ENCOUNTER — APPOINTMENT (OUTPATIENT)
Dept: CARDIOLOGY | Facility: CLINIC | Age: 53
End: 2023-07-21
Payer: COMMERCIAL

## 2023-07-21 VITALS
BODY MASS INDEX: 40.12 KG/M2 | OXYGEN SATURATION: 98 % | HEART RATE: 58 BPM | DIASTOLIC BLOOD PRESSURE: 80 MMHG | HEIGHT: 62 IN | WEIGHT: 218 LBS | RESPIRATION RATE: 16 BRPM | SYSTOLIC BLOOD PRESSURE: 132 MMHG

## 2023-07-21 PROCEDURE — 99214 OFFICE O/P EST MOD 30 MIN: CPT

## 2023-07-21 NOTE — REASON FOR VISIT
[FreeTextEntry1] : MEL COSTA is a 52 year-old F presents here for cardiac follow-up with concerns regarding uncontrolled hypertension. \par \par April '23 her blood pressure was elevated in the setting of Sudafed use for a sinus infection. Toprol caused chest heaviness and discontinued. Her Valsartan was increased from 80 mg daily to 160 mg daily. \par \par Previously, she was seen urgently for elevated blood pressure in the setting of Sudafed use for a sinus infection. Toprol was discontinue as this caused chest heaviness. \par \par Her Valsartan was increased from 80 mg daily to 160 mg daily. HCTZ 12.5 and Nifedipine were added. \par \par 5/3/23, patient c/o bilateral lower extremity edema- after starting Nifedipine. Nifedipine was discontinued and she was advised to add Valsartan 160 mg in the evening. \par \par 6/12/2023 blood pressure still little elevated and Bystolic 2.5 mg was added nightly\par \par States that blood pressure is still elevated, numbers provided average 148/85 meters mercury

## 2023-07-21 NOTE — HISTORY OF PRESENT ILLNESS
[FreeTextEntry1] : Patient reports resolution of LE edema since discontinuing Nifedipine.\par \par She has recently changed her diet to a low carbohydrate program. She has decreased her salt intake significantly. Has been exercising 4-5 days a week. Reports gradual weight loss. Denies any chest pain, palpitations, shortness of breath, edema, orthopnea, or PND. \par \par Denies snoring or daytime somnolence. \par \par \par

## 2023-07-21 NOTE — ASSESSMENT
[FreeTextEntry1] : Echocardiogram   4/11/23\par Normal LV size and function ejection fraction 60%\par Mitral thickened with mild MR\par Mild pulmonary hypertension 40 mmHg\par \par Impression:\par 52-year-old female with hx of situational elevated blood pressures here for cardiac follow-up. \par \par 1.  Hypertension: Mildly elevated on Valsartan-HCTZ 160/12.5 mg in the morning and Valsartan 160 mg in the evening. Nifedipine caused LE edema  and metoprolol caused chest heaviness ; both were discontinued. \par Bystolic 2.5 mg is being tolerated but blood pressure is still high.\par \par 2. Has a hx of ST depressions seen on EKG likely consistent with strain that resolved with a repeat in office EKG. She continues to exercise regularly and has no anginal symptoms. Chest heaviness while taking metoprolol has not recurred. Nuclear stress test in 2016 showed normal SPECT imaging. \par \par 3. Her most recent echocardiogram from April 2023 showed normal LV function and mild PHTN. No hx of PAPITO or pulmonary disease. \par \par 4. Reports elevated triglycerides at her annual physical. No records available for review. \par \par Plan:\par 1.  Add additional hydrochlorothiazide 25 mg daily\par      Move both valsartan and valsartan HCT to be taken together in the a.m.\par      Increase Bystolic to 5 mg.\par      Chart blood pressures for 2 weeks and obtain BMP\par \par 2. Continue to follow a heart healthy diet consisting of more vegetables, leans meats, whole grains, nuts and fruits. Avoid trans fats, saturated fats and processed meats. Avoid salt. \par \par 3. Pt advised to exercise for at least 30 minutes most days of the week. Any cardiac symptoms such as chest pain, palpitations or new shortness of breath should be reported.\par \par 4. Will request rcent labs from her PMD to followup lipids. Repeat lipids to be forwarded to us. \par \par 5. Consider repeat echocardiogram next year to reassess cardiac function and pulmonary artery pressures. \par \par Clinical follow-up in 6 weeks or sooner if needed.

## 2023-08-16 ENCOUNTER — NON-APPOINTMENT (OUTPATIENT)
Age: 53
End: 2023-08-16

## 2023-08-16 LAB
ANION GAP SERPL CALC-SCNC: 15 MMOL/L
BUN SERPL-MCNC: 20 MG/DL
CALCIUM SERPL-MCNC: 9.8 MG/DL
CHLORIDE SERPL-SCNC: 100 MMOL/L
CO2 SERPL-SCNC: 24 MMOL/L
CREAT SERPL-MCNC: 0.81 MG/DL
EGFR: 88 ML/MIN/1.73M2
GLUCOSE SERPL-MCNC: 89 MG/DL
POTASSIUM SERPL-SCNC: 4.1 MMOL/L
SODIUM SERPL-SCNC: 139 MMOL/L

## 2023-10-30 NOTE — PHYSICAL EXAM
[General Appearance - Well Developed] : well developed [Skin Color & Pigmentation] : normal skin color and pigmentation [Edema] : no peripheral edema [] : no respiratory distress [Oriented To Time, Place, And Person] : oriented to person, place, and time [Normal Station and Gait] : the gait and station were normal for the patient's age [No Focal Deficits] : no focal deficits [Abdomen Soft] : soft [Abdomen Tenderness] : non-tender [Costovertebral Angle Tenderness] : no ~M costovertebral angle tenderness see chief complaint quote

## 2023-11-10 ENCOUNTER — APPOINTMENT (OUTPATIENT)
Dept: CARDIOLOGY | Facility: CLINIC | Age: 53
End: 2023-11-10
Payer: COMMERCIAL

## 2023-11-10 VITALS
HEIGHT: 62 IN | OXYGEN SATURATION: 97 % | BODY MASS INDEX: 42.33 KG/M2 | WEIGHT: 230 LBS | DIASTOLIC BLOOD PRESSURE: 80 MMHG | HEART RATE: 54 BPM | SYSTOLIC BLOOD PRESSURE: 138 MMHG | RESPIRATION RATE: 16 BRPM

## 2023-11-10 PROCEDURE — 99214 OFFICE O/P EST MOD 30 MIN: CPT | Mod: 25

## 2023-11-10 PROCEDURE — 93000 ELECTROCARDIOGRAM COMPLETE: CPT

## 2023-11-10 RX ORDER — NEBIVOLOL 5 MG/1
5 TABLET ORAL DAILY
Qty: 90 | Refills: 3 | Status: ACTIVE | COMMUNITY
Start: 2023-06-12 | End: 1900-01-01

## 2023-11-18 ENCOUNTER — OFFICE (OUTPATIENT)
Dept: URBAN - METROPOLITAN AREA CLINIC 12 | Facility: CLINIC | Age: 53
Setting detail: OPHTHALMOLOGY
End: 2023-11-18
Payer: COMMERCIAL

## 2023-11-18 DIAGNOSIS — H00.12: ICD-10-CM

## 2023-11-18 PROCEDURE — 92002 INTRM OPH EXAM NEW PATIENT: CPT | Performed by: OPHTHALMOLOGY

## 2023-11-18 ASSESSMENT — REFRACTION_AUTOREFRACTION
OD_AXIS: 084
OD_CYLINDER: -0.75
OS_CYLINDER: -0.25
OS_SPHERE: +0.50
OS_AXIS: 136
OD_SPHERE: +1.25

## 2023-11-18 ASSESSMENT — CONFRONTATIONAL VISUAL FIELD TEST (CVF)
OS_FINDINGS: FULL
OD_FINDINGS: FULL

## 2023-11-18 ASSESSMENT — SPHEQUIV_DERIVED
OS_SPHEQUIV: 0.375
OD_SPHEQUIV: 0.875

## 2023-12-19 ENCOUNTER — OFFICE (OUTPATIENT)
Dept: URBAN - METROPOLITAN AREA CLINIC 100 | Facility: CLINIC | Age: 53
Setting detail: OPHTHALMOLOGY
End: 2023-12-19
Payer: COMMERCIAL

## 2023-12-19 DIAGNOSIS — H01.002: ICD-10-CM

## 2023-12-19 DIAGNOSIS — H00.12: ICD-10-CM

## 2023-12-19 DIAGNOSIS — H01.005: ICD-10-CM

## 2023-12-19 DIAGNOSIS — H01.001: ICD-10-CM

## 2023-12-19 DIAGNOSIS — H01.004: ICD-10-CM

## 2023-12-19 PROCEDURE — 99213 OFFICE O/P EST LOW 20 MIN: CPT | Performed by: OPHTHALMOLOGY

## 2023-12-19 ASSESSMENT — REFRACTION_AUTOREFRACTION
OD_AXIS: 084
OD_SPHERE: +1.25
OS_SPHERE: +0.50
OD_CYLINDER: -0.75
OS_AXIS: 136
OS_CYLINDER: -0.25

## 2023-12-19 ASSESSMENT — LID EXAM ASSESSMENTS
OD_COMMENTS: COLLARETTES
OS_COMMENTS: COLLARETTES
OS_BLEPHARITIS: LLL LUL 2+
OD_BLEPHARITIS: RLL RUL 2+

## 2023-12-19 ASSESSMENT — SPHEQUIV_DERIVED
OD_SPHEQUIV: 0.875
OS_SPHEQUIV: 0.375

## 2023-12-19 ASSESSMENT — CONFRONTATIONAL VISUAL FIELD TEST (CVF)
OS_FINDINGS: FULL
OD_FINDINGS: FULL

## 2024-04-30 ENCOUNTER — APPOINTMENT (OUTPATIENT)
Dept: CARDIOLOGY | Facility: CLINIC | Age: 54
End: 2024-04-30
Payer: COMMERCIAL

## 2024-04-30 PROCEDURE — 93306 TTE W/DOPPLER COMPLETE: CPT

## 2024-05-06 ENCOUNTER — APPOINTMENT (OUTPATIENT)
Dept: CARDIOLOGY | Facility: CLINIC | Age: 54
End: 2024-05-06
Payer: COMMERCIAL

## 2024-05-06 VITALS
RESPIRATION RATE: 14 BRPM | SYSTOLIC BLOOD PRESSURE: 140 MMHG | BODY MASS INDEX: 41.96 KG/M2 | WEIGHT: 228 LBS | HEART RATE: 56 BPM | DIASTOLIC BLOOD PRESSURE: 86 MMHG | HEIGHT: 62 IN

## 2024-05-06 DIAGNOSIS — I10 ESSENTIAL (PRIMARY) HYPERTENSION: ICD-10-CM

## 2024-05-06 DIAGNOSIS — R60.0 LOCALIZED EDEMA: ICD-10-CM

## 2024-05-06 DIAGNOSIS — I27.20 PULMONARY HYPERTENSION, UNSPECIFIED: ICD-10-CM

## 2024-05-06 DIAGNOSIS — I34.0 NONRHEUMATIC MITRAL (VALVE) INSUFFICIENCY: ICD-10-CM

## 2024-05-06 DIAGNOSIS — E66.9 OBESITY, UNSPECIFIED: ICD-10-CM

## 2024-05-06 DIAGNOSIS — R94.31 ABNORMAL ELECTROCARDIOGRAM [ECG] [EKG]: ICD-10-CM

## 2024-05-06 PROCEDURE — 99214 OFFICE O/P EST MOD 30 MIN: CPT

## 2024-05-06 PROCEDURE — G2211 COMPLEX E/M VISIT ADD ON: CPT | Mod: NC,1L

## 2024-05-06 PROCEDURE — 93000 ELECTROCARDIOGRAM COMPLETE: CPT

## 2024-05-06 RX ORDER — VALSARTAN AND HYDROCHLOROTHIAZIDE 160; 25 MG/1; MG/1
160-25 TABLET, FILM COATED ORAL DAILY
Qty: 90 | Refills: 3 | Status: DISCONTINUED | COMMUNITY
Start: 2023-04-20 | End: 2024-05-06

## 2024-05-06 RX ORDER — VALSARTAN 160 MG/1
160 TABLET, COATED ORAL
Qty: 90 | Refills: 1 | Status: DISCONTINUED | COMMUNITY
Start: 2023-05-03 | End: 2024-05-06

## 2024-05-06 RX ORDER — VALSARTAN AND HYDROCHLOROTHIAZIDE 320; 25 MG/1; MG/1
320-25 TABLET, FILM COATED ORAL DAILY
Qty: 90 | Refills: 3 | Status: ACTIVE | COMMUNITY
Start: 2024-05-06 | End: 1900-01-01

## 2024-05-06 NOTE — HISTORY OF PRESENT ILLNESS
[FreeTextEntry1] : Patient reports feeling well. She denies any chest pain, dizziness, syncope, near-syncope, SOB, edema, orthopnea or PND.   She has not been checking her home blood pressure regularly. Nifedipine was discontinued in the past due to LE edema. Toprol caused chest heaviness.   She denies snoring or daytime somnolence.

## 2024-05-06 NOTE — ASSESSMENT
[FreeTextEntry1] : EKG: Sinus bradycardia at 56  bpm, nonspecific T wave abnormalities as seen on her prior EKG's.   Echocardiogram 4/30/2024 Normal LV size and function 65% Moderate diastolic dysfunction No significant valvular abnormalities Pulmonary artery systolic pressure 36 mmHg  Echocardiogram 4/11/23 Normal LV size and function ejection fraction 60% Mitral thickened with mild MR Mild pulmonary hypertension 40 mmHg  IMPRESSION:  1. Blood pressure mildly elevated possibly related to dietary sodium and recent weight gain. Use of antihypertensives have been limited by side effects. Patient is concerned regarding taking multiple medications and would like to increase dietary and lifestyle changes before increasing medication therapy.   Labs done after increasing Valsartan shows normal renal function and lytes.   2. No hx of HLD or DM. No recent labs for review. She is due for physical through her PMD.   3. No anginal symptoms. EKG shows SR and no acute ischemic changes.   4. Her echocardiogram shows mild pulmonary hypertension and mild MR. Normal LV function. She has no signs of HF on exam. Denies any hx of PAPITO or pulmonary disease.   PLAN:  1.  Manage valsartan doses together at to achieve 320/25.     Continuation of stand-alone hydrochlorothiazide 25     . Avoid salt. Encouraged aggressive dietary and lifestyle changes towards weight loss. Continue BP monitoring at home and bring her log to her next OV.   Call our office if she has persistent BP elevations with SBP >140 mmHg.  Home blood pressure monitoring for 2 weeks  2. Full set of labs through her PMD and results to be forwarded to us.   3. Pt advised to exercise for at least 30 minutes most days of the week. Any cardiac symptoms such as chest pain, palpitations or new shortness of breath should be reported.  4. Repeat echocardiogram next year.   EKG obtained to assist in diagnosis and management of assessed problem(s).  Pt knows to call if any new or worsening symptoms. In the absence of any cardiac associated symptoms clinical follow up can be made in 6 months.

## 2024-05-06 NOTE — REASON FOR VISIT
[FreeTextEntry1] : MEL COSTA is a 53-year-old F with hx of HTN, obesity presents here for cardiac follow-up.

## 2024-05-06 NOTE — REVIEW OF SYSTEMS
[Joint Pain] : joint pain [Negative] : Heme/Lymph [Weight Gain (___ Lbs)] : no recent weight gain [Weight Loss (___ Lbs)] : no recent weight loss

## 2024-05-06 NOTE — PHYSICAL EXAM
[Well Developed] : well developed [Well Nourished] : well nourished [No Acute Distress] : no acute distress [Obese] : obese [Normal Conjunctiva] : normal conjunctiva [Normal Venous Pressure] : normal venous pressure [No Carotid Bruit] : no carotid bruit [Normal S1, S2] : normal S1, S2 [No Murmur] : no murmur [No Rub] : no rub [No Gallop] : no gallop [Clear Lung Fields] : clear lung fields [Good Air Entry] : good air entry [No Respiratory Distress] : no respiratory distress  [Soft] : abdomen soft [Non Tender] : non-tender [No Masses/organomegaly] : no masses/organomegaly [Normal Bowel Sounds] : normal bowel sounds [Normal Gait] : normal gait [No Edema] : no edema [No Cyanosis] : no cyanosis [No Clubbing] : no clubbing [No Varicosities] : no varicosities [No Rash] : no rash [No Skin Lesions] : no skin lesions [Moves all extremities] : moves all extremities [No Focal Deficits] : no focal deficits [Normal Speech] : normal speech [Alert and Oriented] : alert and oriented [Normal memory] : normal memory [de-identified] : BMI 41

## 2024-05-17 ENCOUNTER — APPOINTMENT (OUTPATIENT)
Dept: OBGYN | Facility: CLINIC | Age: 54
End: 2024-05-17
Payer: COMMERCIAL

## 2024-05-17 VITALS
SYSTOLIC BLOOD PRESSURE: 118 MMHG | DIASTOLIC BLOOD PRESSURE: 70 MMHG | BODY MASS INDEX: 40.48 KG/M2 | WEIGHT: 220 LBS | HEIGHT: 62 IN

## 2024-05-17 DIAGNOSIS — Z11.51 ENCOUNTER FOR SCREENING FOR HUMAN PAPILLOMAVIRUS (HPV): ICD-10-CM

## 2024-05-17 DIAGNOSIS — N95.1 MENOPAUSAL AND FEMALE CLIMACTERIC STATES: ICD-10-CM

## 2024-05-17 DIAGNOSIS — Z01.419 ENCOUNTER FOR GYNECOLOGICAL EXAMINATION (GENERAL) (ROUTINE) W/OUT ABNORMAL FINDINGS: ICD-10-CM

## 2024-05-17 PROCEDURE — 99396 PREV VISIT EST AGE 40-64: CPT

## 2024-05-17 NOTE — PLAN
[FreeTextEntry1] : Pap smear completed patient will have mammogram and bone density continue vitamin D3 multivitamin discussed treatment options for menopausal symptoms patient not interested at this time she will return in 12 months

## 2024-05-17 NOTE — PHYSICAL EXAM
[Chaperone Present] : A chaperone was present in the examining room during all aspects of the physical examination [FreeTextEntry2] : AB [Appropriately responsive] : appropriately responsive [Alert] : alert [No Acute Distress] : no acute distress [No Lymphadenopathy] : no lymphadenopathy [Regular Rate Rhythm] : regular rate rhythm [No Murmurs] : no murmurs [Clear to Auscultation B/L] : clear to auscultation bilaterally [Soft] : soft [Non-tender] : non-tender [Non-distended] : non-distended [No HSM] : No HSM [No Lesions] : no lesions [No Mass] : no mass [Oriented x3] : oriented x3 [FreeTextEntry6] : Symmetrical, no masses nontender [Examination Of The Breasts] : a normal appearance [Breast Palpation Diffuse Fibrous Tissue Bilateral] : fibrocystic changes [No Masses] : no breast masses were palpable [Labia Majora] : normal [Labia Minora] : normal [Normal] : normal [Absent] : absent [Uterine Adnexae] : normal [Declined] : Patient declined rectal exam [FreeTextEntry8] : No masses nontender, limited secondary to body habitus

## 2024-05-17 NOTE — HISTORY OF PRESENT ILLNESS
[HIV test declined] : HIV test declined [Syphilis test declined] : Syphilis test declined [Gonorrhea test declined] : Gonorrhea test declined [Chlamydia test declined] : Chlamydia test declined [Trichomonas test declined] : Trichomonas test declined [HPV test declined] : HPV test declined [Hepatitis B test declined] : Hepatitis B test declined [Hepatitis C test declined] : Hepatitis C test declined [postmenopausal] : postmenopausal [N] : Patient does not use contraception [Y] : Positive pregnancy history [Prior Menses:  ___ (Date)] : date of prior menstruation was [unfilled] [Currently Active] : currently active [Men] : men [Vaginal] : vaginal [No] : No [Yes] : Yes [Patient reported colonoscopy was normal] : Patient reported colonoscopy was normal [TextBox_4] : PT HERE FOR ANNUAL EXAM LMP: 2017  [Mammogramdate] : 6/4/2022 [TextBox_19] : br2 [PapSmeardate] : 4/25/23 [TextBox_31] : WNL  [BoneDensityDate] : 4/26/22 [TextBox_37] : OSTEOPENIA [ColonoscopyDate] : 2020 [LMPDate] : 2017 [PGxTotal] : 1 [[facility] Hospital[facility]erm] : 1 [[facility] Hospitaliving] : 1 [TextBox_6] : 2017 [FreeTextEntry1] : 2017

## 2024-05-20 LAB
HPV HIGH+LOW RISK DNA PNL CVX: NOT DETECTED
HPV HIGH+LOW RISK DNA PNL CVX: NOT DETECTED

## 2024-05-21 LAB
C TRACH RRNA SPEC QL NAA+PROBE: NOT DETECTED
N GONORRHOEA RRNA SPEC QL NAA+PROBE: NOT DETECTED
SOURCE TP AMPLIFICATION: NORMAL

## 2024-05-28 ENCOUNTER — APPOINTMENT (OUTPATIENT)
Dept: DERMATOLOGY | Facility: CLINIC | Age: 54
End: 2024-05-28
Payer: COMMERCIAL

## 2024-05-28 LAB — CYTOLOGY CVX/VAG DOC THIN PREP: NORMAL

## 2024-05-28 PROCEDURE — 99212 OFFICE O/P EST SF 10 MIN: CPT

## 2024-06-10 RX ORDER — HYDROCHLOROTHIAZIDE 25 MG/1
25 TABLET ORAL DAILY
Qty: 30 | Refills: 3 | Status: ACTIVE | COMMUNITY
Start: 2023-07-21 | End: 1900-01-01

## 2024-07-26 NOTE — H&P PST ADULT - ENERGY EXPENDITURE (METS)
[facility] Progress Note With Procedure    Aide Rae  AGE: 56 y.o.   GENDER: female  : 1967  EPISODE DATE:  2024     Subjective:     Chief Complaint   Patient presents with    Wound Check     Left leg          HISTORY of PRESENT ILLNESS     Aide Rae is a 56 y.o. female who presents [facility] for a visit for evaluation and treatment of Chronic venous  ulcer(s) of  L lower leg anterior.  The condition is of moderate severity. The ulcer has been present for 8 weeks.  The underlying cause is thought to be venous stasis.  The patients care to date has included a course of keflex, but patient was having GI issues and did not start this. The patient has significant underlying medical conditions as below.     2024: Patient looks a bit better after debridement last week and wearing compression. Was irritated and removed it on Wednesday. Will modify orders for comfort this week and change collagen     2024: Patient irritation and redness resolved but wounds remain. Need to try to get her to agree to compression. Will try mikhail score wounds to activate granular tissue     7-: Patient easily debrided today and smaller and less red and painful  Will add collagen this week     7-3-2024: Patient looks better. Finished steroids and using lotrisone and reports improvement in symptoms. Not as red. Needs a heavier debridement today. Stay with no adhesives      2024: Patient looks worse. Not sure what is going on. Needs a new poc     **Patient has a cardiologist in [place]. Will have her call his office for a venous work up when we are done here     2024: Patient culture reviewed and unremarkable. Looking better. No pain with debridement. Will continue plan of care  Brought her compressions and these are appropriate     2024: Patient not much improved. Reports excessive drainage and pain. Culture did not show anything last time but I am positive she needs  as per DASI score 9.89

## 2024-08-27 ENCOUNTER — APPOINTMENT (OUTPATIENT)
Dept: CARDIOLOGY | Facility: CLINIC | Age: 54
End: 2024-08-27
Payer: COMMERCIAL

## 2024-08-27 VITALS
HEIGHT: 62 IN | HEART RATE: 48 BPM | BODY MASS INDEX: 41.41 KG/M2 | WEIGHT: 225 LBS | DIASTOLIC BLOOD PRESSURE: 84 MMHG | RESPIRATION RATE: 16 BRPM | SYSTOLIC BLOOD PRESSURE: 142 MMHG

## 2024-08-27 DIAGNOSIS — I10 ESSENTIAL (PRIMARY) HYPERTENSION: ICD-10-CM

## 2024-08-27 DIAGNOSIS — I27.20 PULMONARY HYPERTENSION, UNSPECIFIED: ICD-10-CM

## 2024-08-27 DIAGNOSIS — I34.0 NONRHEUMATIC MITRAL (VALVE) INSUFFICIENCY: ICD-10-CM

## 2024-08-27 DIAGNOSIS — R94.31 ABNORMAL ELECTROCARDIOGRAM [ECG] [EKG]: ICD-10-CM

## 2024-08-27 DIAGNOSIS — G47.9 SLEEP DISORDER, UNSPECIFIED: ICD-10-CM

## 2024-08-27 DIAGNOSIS — E66.9 OBESITY, UNSPECIFIED: ICD-10-CM

## 2024-08-27 DIAGNOSIS — R60.0 LOCALIZED EDEMA: ICD-10-CM

## 2024-08-27 PROCEDURE — 99214 OFFICE O/P EST MOD 30 MIN: CPT

## 2024-08-27 PROCEDURE — G2211 COMPLEX E/M VISIT ADD ON: CPT | Mod: NC

## 2024-08-27 PROCEDURE — 93000 ELECTROCARDIOGRAM COMPLETE: CPT

## 2024-08-27 NOTE — HISTORY OF PRESENT ILLNESS
[FreeTextEntry1] : Patient reports feeling well. She denies any chest pain, dizziness, syncope, near-syncope, SOB, edema, orthopnea or PND.   She has not been checking her home blood pressure regularly. Nifedipine was discontinued in the past due to LE edema. Toprol caused chest heaviness.  Nebivolol is being well-tolerated.  She denies snoring or daytime somnolence.

## 2024-08-27 NOTE — PHYSICAL EXAM
[Well Nourished] : well nourished [No Acute Distress] : no acute distress [Obese] : obese [Normal Conjunctiva] : normal conjunctiva [No Carotid Bruit] : no carotid bruit [Normal Venous Pressure] : normal venous pressure [Normal S1, S2] : normal S1, S2 [No Murmur] : no murmur [No Rub] : no rub [No Gallop] : no gallop [Clear Lung Fields] : clear lung fields [Good Air Entry] : good air entry [No Respiratory Distress] : no respiratory distress  [Soft] : abdomen soft [Non Tender] : non-tender [No Masses/organomegaly] : no masses/organomegaly [Normal Bowel Sounds] : normal bowel sounds [Normal Gait] : normal gait [No Edema] : no edema [No Cyanosis] : no cyanosis [No Clubbing] : no clubbing [No Varicosities] : no varicosities [No Rash] : no rash [No Skin Lesions] : no skin lesions [Moves all extremities] : moves all extremities [No Focal Deficits] : no focal deficits [Normal Speech] : normal speech [Alert and Oriented] : alert and oriented [Normal memory] : normal memory [de-identified] : BMI 41

## 2024-08-27 NOTE — ASSESSMENT
[FreeTextEntry1] : EKG: Sinus bradycardia at 48  bpm, nonspecific T wave abnormalities as seen on her prior EKG's.   Echocardiogram 4/30/2024 Normal LV size and function 65% Moderate diastolic dysfunction No significant valvular abnormalities Pulmonary artery systolic pressure 36 mmHg  Echocardiogram 4/11/23 Normal LV size and function ejection fraction 60% Mitral thickened with mild MR Mild pulmonary hypertension 40 mmHg  IMPRESSION:  1. Blood pressure mildly elevated possibly related to dietary sodium and recent weight gain.  She does not check her blood pressures at home.  Labs done after increasing Valsartan shows normal renal function and lytes.   2. No hx of HLD or DM. No recent labs for review. She is due for physical through her PMD.   3. No anginal symptoms. EKG shows SR and no acute ischemic changes.   4. Her echocardiogram shows mild pulmonary hypertension and mild MR. Normal LV function. She has no signs of HF on exam. Denies any hx of PAPITO or pulmonary disease.  Thus far, pulmonary hypertension has not been well explained.  PLAN:  1.  Manage valsartan doses together at to achieve 320/25.     Continuation of stand-alone hydrochlorothiazide 25     . Avoid salt. Encouraged aggressive dietary and lifestyle changes towards weight loss.      Continue BP monitoring at home and report on log in 2 weeks  2. Full set of labs through her PMD and results to be forwarded to us.   3. Pt advised to exercise for at least 30 minutes most days of the week. Any cardiac symptoms such as chest pain, palpitations or new shortness of breath should be reported.  Discussed with patient the addition of resistance exercise to her walking regimen.  4. Repeat echocardiogram next year.   5.  In view of body habitus, pulmonary hypertension, narrowed airway, have recommended a home sleep study.  EKG obtained to assist in diagnosis and management of assessed problem(s).   36.9

## 2024-08-27 NOTE — REVIEW OF SYSTEMS
[Joint Pain] : joint pain [Negative] : Heme/Lymph [Weight Gain (___ Lbs)] : [unfilled] ~Ulb weight gain [Weight Loss (___ Lbs)] : no recent weight loss

## 2024-09-30 ENCOUNTER — NON-APPOINTMENT (OUTPATIENT)
Age: 54
End: 2024-09-30

## 2024-12-26 ENCOUNTER — APPOINTMENT (OUTPATIENT)
Dept: PULMONOLOGY | Facility: CLINIC | Age: 54
End: 2024-12-26
Payer: COMMERCIAL

## 2024-12-26 VITALS
SYSTOLIC BLOOD PRESSURE: 132 MMHG | RESPIRATION RATE: 16 BRPM | HEIGHT: 62 IN | OXYGEN SATURATION: 97 % | DIASTOLIC BLOOD PRESSURE: 72 MMHG | BODY MASS INDEX: 39.56 KG/M2 | WEIGHT: 215 LBS | HEART RATE: 60 BPM

## 2024-12-26 DIAGNOSIS — E66.9 OBESITY, UNSPECIFIED: ICD-10-CM

## 2024-12-26 DIAGNOSIS — G47.19 OTHER HYPERSOMNIA: ICD-10-CM

## 2024-12-26 DIAGNOSIS — K21.9 GASTRO-ESOPHAGEAL REFLUX DISEASE W/OUT ESOPHAGITIS: ICD-10-CM

## 2024-12-26 DIAGNOSIS — I27.20 PULMONARY HYPERTENSION, UNSPECIFIED: ICD-10-CM

## 2024-12-26 DIAGNOSIS — G47.33 OBSTRUCTIVE SLEEP APNEA (ADULT) (PEDIATRIC): ICD-10-CM

## 2024-12-26 DIAGNOSIS — R06.83 SNORING: ICD-10-CM

## 2024-12-26 PROCEDURE — 99203 OFFICE O/P NEW LOW 30 MIN: CPT

## 2025-01-14 ENCOUNTER — APPOINTMENT (OUTPATIENT)
Dept: CARDIOLOGY | Facility: CLINIC | Age: 55
End: 2025-01-14
Payer: COMMERCIAL

## 2025-01-14 VITALS
HEIGHT: 62 IN | HEART RATE: 56 BPM | OXYGEN SATURATION: 100 % | RESPIRATION RATE: 16 BRPM | SYSTOLIC BLOOD PRESSURE: 136 MMHG | WEIGHT: 227 LBS | DIASTOLIC BLOOD PRESSURE: 84 MMHG | BODY MASS INDEX: 41.77 KG/M2

## 2025-01-14 DIAGNOSIS — I10 ESSENTIAL (PRIMARY) HYPERTENSION: ICD-10-CM

## 2025-01-14 DIAGNOSIS — I27.20 PULMONARY HYPERTENSION, UNSPECIFIED: ICD-10-CM

## 2025-01-14 DIAGNOSIS — E66.9 OBESITY, UNSPECIFIED: ICD-10-CM

## 2025-01-14 DIAGNOSIS — G47.33 OBSTRUCTIVE SLEEP APNEA (ADULT) (PEDIATRIC): ICD-10-CM

## 2025-01-14 DIAGNOSIS — G47.19 OTHER HYPERSOMNIA: ICD-10-CM

## 2025-01-14 PROCEDURE — 93000 ELECTROCARDIOGRAM COMPLETE: CPT

## 2025-01-14 PROCEDURE — G2211 COMPLEX E/M VISIT ADD ON: CPT | Mod: NC

## 2025-01-14 PROCEDURE — 99214 OFFICE O/P EST MOD 30 MIN: CPT

## 2025-03-27 ENCOUNTER — APPOINTMENT (OUTPATIENT)
Dept: PULMONOLOGY | Facility: CLINIC | Age: 55
End: 2025-03-27

## 2025-05-29 ENCOUNTER — APPOINTMENT (OUTPATIENT)
Dept: DERMATOLOGY | Facility: CLINIC | Age: 55
End: 2025-05-29
Payer: COMMERCIAL

## 2025-05-29 PROCEDURE — 99213 OFFICE O/P EST LOW 20 MIN: CPT

## 2025-06-03 ENCOUNTER — APPOINTMENT (OUTPATIENT)
Dept: OBGYN | Facility: CLINIC | Age: 55
End: 2025-06-03
Payer: COMMERCIAL

## 2025-06-03 ENCOUNTER — NON-APPOINTMENT (OUTPATIENT)
Age: 55
End: 2025-06-03

## 2025-06-03 VITALS
HEART RATE: 85 BPM | WEIGHT: 217 LBS | DIASTOLIC BLOOD PRESSURE: 72 MMHG | BODY MASS INDEX: 39.93 KG/M2 | SYSTOLIC BLOOD PRESSURE: 112 MMHG | HEIGHT: 62 IN

## 2025-06-03 DIAGNOSIS — Z13.820 ENCOUNTER FOR SCREENING FOR OSTEOPOROSIS: ICD-10-CM

## 2025-06-03 DIAGNOSIS — Z01.419 ENCOUNTER FOR GYNECOLOGICAL EXAMINATION (GENERAL) (ROUTINE) W/OUT ABNORMAL FINDINGS: ICD-10-CM

## 2025-06-03 PROCEDURE — 99459 PELVIC EXAMINATION: CPT

## 2025-06-03 PROCEDURE — 99396 PREV VISIT EST AGE 40-64: CPT

## 2025-06-06 LAB — CYTOLOGY CVX/VAG DOC THIN PREP: NORMAL

## 2025-06-30 ENCOUNTER — APPOINTMENT (OUTPATIENT)
Dept: DERMATOLOGY | Facility: CLINIC | Age: 55
End: 2025-06-30

## 2025-07-21 ENCOUNTER — APPOINTMENT (OUTPATIENT)
Dept: CARDIOLOGY | Facility: CLINIC | Age: 55
End: 2025-07-21
Payer: COMMERCIAL

## 2025-07-21 PROCEDURE — 93306 TTE W/DOPPLER COMPLETE: CPT

## 2025-08-08 ENCOUNTER — APPOINTMENT (OUTPATIENT)
Dept: CARDIOLOGY | Facility: CLINIC | Age: 55
End: 2025-08-08
Payer: COMMERCIAL

## 2025-08-08 VITALS
WEIGHT: 218 LBS | RESPIRATION RATE: 16 BRPM | HEART RATE: 54 BPM | DIASTOLIC BLOOD PRESSURE: 90 MMHG | OXYGEN SATURATION: 98 % | SYSTOLIC BLOOD PRESSURE: 160 MMHG | BODY MASS INDEX: 40.12 KG/M2 | HEIGHT: 62 IN

## 2025-08-08 DIAGNOSIS — R94.31 ABNORMAL ELECTROCARDIOGRAM [ECG] [EKG]: ICD-10-CM

## 2025-08-08 DIAGNOSIS — R60.0 LOCALIZED EDEMA: ICD-10-CM

## 2025-08-08 DIAGNOSIS — I34.0 NONRHEUMATIC MITRAL (VALVE) INSUFFICIENCY: ICD-10-CM

## 2025-08-08 DIAGNOSIS — I10 ESSENTIAL (PRIMARY) HYPERTENSION: ICD-10-CM

## 2025-08-08 DIAGNOSIS — I27.20 PULMONARY HYPERTENSION, UNSPECIFIED: ICD-10-CM

## 2025-08-08 DIAGNOSIS — E66.9 OBESITY, UNSPECIFIED: ICD-10-CM

## 2025-08-08 PROCEDURE — 99214 OFFICE O/P EST MOD 30 MIN: CPT

## 2025-08-08 PROCEDURE — 93000 ELECTROCARDIOGRAM COMPLETE: CPT

## 2025-08-08 PROCEDURE — G2211 COMPLEX E/M VISIT ADD ON: CPT | Mod: NC

## 2025-08-21 ENCOUNTER — OUTPATIENT (OUTPATIENT)
Dept: OUTPATIENT SERVICES | Facility: HOSPITAL | Age: 55
LOS: 1 days | End: 2025-08-21
Payer: COMMERCIAL

## 2025-08-21 ENCOUNTER — APPOINTMENT (OUTPATIENT)
Dept: ULTRASOUND IMAGING | Facility: CLINIC | Age: 55
End: 2025-08-21
Payer: COMMERCIAL

## 2025-08-21 DIAGNOSIS — Z98.89 OTHER SPECIFIED POSTPROCEDURAL STATES: Chronic | ICD-10-CM

## 2025-08-21 DIAGNOSIS — C44.91 BASAL CELL CARCINOMA OF SKIN, UNSPECIFIED: Chronic | ICD-10-CM

## 2025-08-21 DIAGNOSIS — Z00.00 ENCOUNTER FOR GENERAL ADULT MEDICAL EXAMINATION WITHOUT ABNORMAL FINDINGS: ICD-10-CM

## 2025-08-21 PROCEDURE — 76536 US EXAM OF HEAD AND NECK: CPT

## 2025-08-21 PROCEDURE — 76536 US EXAM OF HEAD AND NECK: CPT | Mod: 26
